# Patient Record
Sex: FEMALE | Race: WHITE | NOT HISPANIC OR LATINO | URBAN - METROPOLITAN AREA
[De-identification: names, ages, dates, MRNs, and addresses within clinical notes are randomized per-mention and may not be internally consistent; named-entity substitution may affect disease eponyms.]

---

## 2017-08-08 ENCOUNTER — OUTPATIENT (OUTPATIENT)
Dept: OUTPATIENT SERVICES | Facility: HOSPITAL | Age: 77
LOS: 1 days | Discharge: HOME | End: 2017-08-08

## 2017-08-08 DIAGNOSIS — E11.9 TYPE 2 DIABETES MELLITUS WITHOUT COMPLICATIONS: ICD-10-CM

## 2017-08-08 DIAGNOSIS — E78.4 OTHER HYPERLIPIDEMIA: ICD-10-CM

## 2017-08-08 DIAGNOSIS — I10 ESSENTIAL (PRIMARY) HYPERTENSION: ICD-10-CM

## 2017-08-08 DIAGNOSIS — R07.9 CHEST PAIN, UNSPECIFIED: ICD-10-CM

## 2017-08-08 DIAGNOSIS — E03.9 HYPOTHYROIDISM, UNSPECIFIED: ICD-10-CM

## 2017-08-08 DIAGNOSIS — I48.2 CHRONIC ATRIAL FIBRILLATION: ICD-10-CM

## 2017-08-08 DIAGNOSIS — E78.5 HYPERLIPIDEMIA, UNSPECIFIED: ICD-10-CM

## 2017-08-08 DIAGNOSIS — I48.91 UNSPECIFIED ATRIAL FIBRILLATION: ICD-10-CM

## 2019-07-11 ENCOUNTER — OUTPATIENT (OUTPATIENT)
Dept: OUTPATIENT SERVICES | Facility: HOSPITAL | Age: 79
LOS: 1 days | Discharge: HOME | End: 2019-07-11

## 2019-07-11 DIAGNOSIS — E03.9 HYPOTHYROIDISM, UNSPECIFIED: ICD-10-CM

## 2019-07-11 DIAGNOSIS — I10 ESSENTIAL (PRIMARY) HYPERTENSION: ICD-10-CM

## 2019-07-11 DIAGNOSIS — E78.5 HYPERLIPIDEMIA, UNSPECIFIED: ICD-10-CM

## 2019-07-11 DIAGNOSIS — E11.9 TYPE 2 DIABETES MELLITUS WITHOUT COMPLICATIONS: ICD-10-CM

## 2023-04-24 PROBLEM — Z00.00 ENCOUNTER FOR PREVENTIVE HEALTH EXAMINATION: Status: ACTIVE | Noted: 2023-04-24

## 2023-05-23 ENCOUNTER — APPOINTMENT (OUTPATIENT)
Dept: CARDIOLOGY | Facility: CLINIC | Age: 83
End: 2023-05-23
Payer: MEDICARE

## 2023-05-23 ENCOUNTER — NON-APPOINTMENT (OUTPATIENT)
Age: 83
End: 2023-05-23

## 2023-05-23 VITALS
DIASTOLIC BLOOD PRESSURE: 90 MMHG | BODY MASS INDEX: 27.97 KG/M2 | TEMPERATURE: 97 F | HEIGHT: 62 IN | SYSTOLIC BLOOD PRESSURE: 162 MMHG | RESPIRATION RATE: 18 BRPM | OXYGEN SATURATION: 96 % | WEIGHT: 152 LBS | HEART RATE: 56 BPM

## 2023-05-23 VITALS — DIASTOLIC BLOOD PRESSURE: 86 MMHG | SYSTOLIC BLOOD PRESSURE: 140 MMHG

## 2023-05-23 DIAGNOSIS — Z86.79 PERSONAL HISTORY OF OTHER DISEASES OF THE CIRCULATORY SYSTEM: ICD-10-CM

## 2023-05-23 DIAGNOSIS — Z86.39 PERSONAL HISTORY OF OTHER ENDOCRINE, NUTRITIONAL AND METABOLIC DISEASE: ICD-10-CM

## 2023-05-23 DIAGNOSIS — Z78.9 OTHER SPECIFIED HEALTH STATUS: ICD-10-CM

## 2023-05-23 PROCEDURE — 93000 ELECTROCARDIOGRAM COMPLETE: CPT

## 2023-05-23 PROCEDURE — 99204 OFFICE O/P NEW MOD 45 MIN: CPT | Mod: 25

## 2023-05-23 RX ORDER — PRAVASTATIN SODIUM 20 MG/1
20 TABLET ORAL DAILY
Refills: 0 | Status: ACTIVE | COMMUNITY

## 2023-05-23 RX ORDER — LEVOTHYROXINE SODIUM 88 UG/1
88 TABLET ORAL DAILY
Refills: 0 | Status: ACTIVE | COMMUNITY

## 2023-05-23 RX ORDER — METOPROLOL SUCCINATE 100 MG/1
100 TABLET, EXTENDED RELEASE ORAL DAILY
Refills: 0 | Status: ACTIVE | COMMUNITY

## 2023-05-23 RX ORDER — FERROUS SULFATE 325(65) MG
325 TABLET ORAL
Refills: 0 | Status: ACTIVE | COMMUNITY

## 2023-05-23 RX ORDER — APIXABAN 5 MG/1
5 TABLET, FILM COATED ORAL
Refills: 0 | Status: ACTIVE | COMMUNITY

## 2023-05-29 NOTE — REVIEW OF SYSTEMS
[Feeling Fatigued] : feeling fatigued [Joint Pain] : joint pain [Negative] : Heme/Lymph [FreeTextEntry5] : see hpi

## 2023-05-29 NOTE — PHYSICAL EXAM
[No Acute Distress] : no acute distress [Normal Venous Pressure] : normal venous pressure [No Carotid Bruit] : no carotid bruit [Normal S1, S2] : normal S1, S2 [No Rub] : no rub [No Gallop] : no gallop [Murmur] : murmur [Clear Lung Fields] : clear lung fields [Good Air Entry] : good air entry [No Respiratory Distress] : no respiratory distress  [Soft] : abdomen soft [Non Tender] : non-tender [No Masses/organomegaly] : no masses/organomegaly [Normal Bowel Sounds] : normal bowel sounds [Abnormal Gait] : abnormal gait [No Edema] : no edema [No Cyanosis] : no cyanosis [No Clubbing] : no clubbing [No Varicosities] : no varicosities [No Rash] : no rash [No Skin Lesions] : no skin lesions [Moves all extremities] : moves all extremities [No Focal Deficits] : no focal deficits [Normal Speech] : normal speech [Alert and Oriented] : alert and oriented [Normal memory] : normal memory [de-identified] : 2/6 syst m

## 2023-05-29 NOTE — ASSESSMENT
[FreeTextEntry1] : 81 yo female with pmhx and presentation as above\par monitor bp at home, might need escalation of rx as bp still not at goal\par cont a/c and amio\par yearly tfts and pfts\par all labs and last echo reviewed\par aggressive risk modif\par diet and act as tolerated\par rtc 6-8 weeks

## 2023-05-29 NOTE — HISTORY OF PRESENT ILLNESS
[FreeTextEntry1] : 83 yo female with pmhx as below is here for her initial eval\par long standing htn/diastolic chf\par paroxysmal a.fib, on long term ac\par no major cv complains\par bp is labile\par et is stable \par ros is otherwise as below

## 2023-06-30 ENCOUNTER — APPOINTMENT (OUTPATIENT)
Dept: CARDIOLOGY | Facility: CLINIC | Age: 83
End: 2023-06-30

## 2023-10-31 ENCOUNTER — APPOINTMENT (OUTPATIENT)
Dept: CARDIOLOGY | Facility: CLINIC | Age: 83
End: 2023-10-31
Payer: MEDICARE

## 2023-10-31 VITALS — DIASTOLIC BLOOD PRESSURE: 92 MMHG | SYSTOLIC BLOOD PRESSURE: 160 MMHG

## 2023-10-31 VITALS
HEIGHT: 62 IN | HEART RATE: 56 BPM | WEIGHT: 160 LBS | DIASTOLIC BLOOD PRESSURE: 102 MMHG | SYSTOLIC BLOOD PRESSURE: 200 MMHG | BODY MASS INDEX: 29.44 KG/M2

## 2023-10-31 PROCEDURE — 93000 ELECTROCARDIOGRAM COMPLETE: CPT

## 2023-10-31 PROCEDURE — 99214 OFFICE O/P EST MOD 30 MIN: CPT | Mod: 25

## 2023-12-18 ENCOUNTER — NON-APPOINTMENT (OUTPATIENT)
Age: 83
End: 2023-12-18

## 2024-01-08 ENCOUNTER — NON-APPOINTMENT (OUTPATIENT)
Age: 84
End: 2024-01-08

## 2024-01-09 ENCOUNTER — APPOINTMENT (OUTPATIENT)
Dept: CARDIOLOGY | Facility: CLINIC | Age: 84
End: 2024-01-09
Payer: MEDICARE

## 2024-01-09 VITALS — HEART RATE: 89 BPM | DIASTOLIC BLOOD PRESSURE: 70 MMHG | SYSTOLIC BLOOD PRESSURE: 112 MMHG | HEIGHT: 62 IN

## 2024-01-09 PROCEDURE — 99214 OFFICE O/P EST MOD 30 MIN: CPT | Mod: 25

## 2024-01-09 PROCEDURE — 93000 ELECTROCARDIOGRAM COMPLETE: CPT

## 2024-01-09 RX ORDER — HYDROCHLOROTHIAZIDE 12.5 MG/1
12.5 TABLET ORAL DAILY
Refills: 0 | Status: DISCONTINUED | COMMUNITY
End: 2024-01-09

## 2024-01-09 RX ORDER — LOSARTAN POTASSIUM AND HYDROCHLOROTHIAZIDE 12.5; 5 MG/1; MG/1
50-12.5 TABLET ORAL DAILY
Refills: 0 | Status: DISCONTINUED | COMMUNITY
End: 2024-01-09

## 2024-01-11 LAB
ALBUMIN SERPL ELPH-MCNC: 3.7 G/DL
ALP BLD-CCNC: 103 U/L
ALT SERPL-CCNC: 20 U/L
ANION GAP SERPL CALC-SCNC: 16 MMOL/L
AST SERPL-CCNC: 34 U/L
BILIRUB SERPL-MCNC: 0.5 MG/DL
BUN SERPL-MCNC: 33 MG/DL
CALCIUM SERPL-MCNC: 9 MG/DL
CHLORIDE SERPL-SCNC: 92 MMOL/L
CHOLEST SERPL-MCNC: 119 MG/DL
CK SERPL-CCNC: 48 U/L
CO2 SERPL-SCNC: 29 MMOL/L
CREAT SERPL-MCNC: 1.43 MG/DL
EGFR: 37 ML/MIN/1.73M2
GLUCOSE SERPL-MCNC: 147 MG/DL
HCT VFR BLD CALC: 41.5 %
HDLC SERPL-MCNC: 54 MG/DL
HGB BLD-MCNC: 13.2 G/DL
LDLC SERPL CALC-MCNC: 47 MG/DL
MCHC RBC-ENTMCNC: 24.9 PG
MCHC RBC-ENTMCNC: 31.8 GM/DL
MCV RBC AUTO: 78.3 FL
NONHDLC SERPL-MCNC: 65 MG/DL
PLATELET # BLD AUTO: 151 K/UL
POTASSIUM SERPL-SCNC: 3.5 MMOL/L
PROT SERPL-MCNC: 6.3 G/DL
RBC # BLD: 5.3 M/UL
RBC # FLD: 19.5 %
SODIUM SERPL-SCNC: 137 MMOL/L
TRIGL SERPL-MCNC: 98 MG/DL
TSH SERPL-ACNC: 4.19 UIU/ML
WBC # FLD AUTO: 9.38 K/UL

## 2024-05-17 ENCOUNTER — APPOINTMENT (OUTPATIENT)
Dept: CARDIOLOGY | Facility: CLINIC | Age: 84
End: 2024-05-17
Payer: MEDICARE

## 2024-05-17 VITALS
HEIGHT: 62 IN | HEART RATE: 62 BPM | SYSTOLIC BLOOD PRESSURE: 180 MMHG | DIASTOLIC BLOOD PRESSURE: 100 MMHG | BODY MASS INDEX: 29.44 KG/M2 | WEIGHT: 160 LBS

## 2024-05-17 VITALS — DIASTOLIC BLOOD PRESSURE: 98 MMHG | SYSTOLIC BLOOD PRESSURE: 178 MMHG

## 2024-05-17 PROCEDURE — 99214 OFFICE O/P EST MOD 30 MIN: CPT | Mod: 25

## 2024-05-17 PROCEDURE — 93000 ELECTROCARDIOGRAM COMPLETE: CPT

## 2024-05-17 RX ORDER — LOSARTAN POTASSIUM AND HYDROCHLOROTHIAZIDE 12.5; 1 MG/1; MG/1
100-12.5 TABLET ORAL DAILY
Qty: 90 | Refills: 3 | Status: ACTIVE | COMMUNITY
Start: 2024-05-17 | End: 1900-01-01

## 2024-05-17 RX ORDER — LOSARTAN POTASSIUM 100 MG/1
100 TABLET, FILM COATED ORAL DAILY
Qty: 3 | Refills: 3 | Status: DISCONTINUED | COMMUNITY
Start: 2024-01-09 | End: 2024-05-17

## 2024-05-19 NOTE — HISTORY OF PRESENT ILLNESS
[FreeTextEntry1] : 82 yo female with pmhx as below is here for her f/up visit long standing htn/diastolic chf paroxysmal a.fib, on long term ac no major cv complains denies chest pain, has SOB with exertion, has occasional palpitations.  recent admission to [facility] with PNA B/p at home has been 160//90 hydralazine 50mg TID was added B/p elevated today.   et is stable  ros is otherwise as below 01/09/24 Chol 119 Trig 98 LDL 47 TSH 4.19 05/04/24 HbA1C 6.1

## 2024-05-19 NOTE — PHYSICAL EXAM
[No Acute Distress] : no acute distress [Normal Venous Pressure] : normal venous pressure [No Carotid Bruit] : no carotid bruit [Normal S1, S2] : normal S1, S2 [No Rub] : no rub [No Gallop] : no gallop [Murmur] : murmur [Clear Lung Fields] : clear lung fields [Good Air Entry] : good air entry [No Respiratory Distress] : no respiratory distress  [Soft] : abdomen soft [Non Tender] : non-tender [No Masses/organomegaly] : no masses/organomegaly [Normal Bowel Sounds] : normal bowel sounds [Abnormal Gait] : abnormal gait [No Edema] : no edema [No Cyanosis] : no cyanosis [No Clubbing] : no clubbing [No Varicosities] : no varicosities [No Rash] : no rash [No Skin Lesions] : no skin lesions [Moves all extremities] : moves all extremities [No Focal Deficits] : no focal deficits [Normal Speech] : normal speech [Alert and Oriented] : alert and oriented [Normal memory] : normal memory [de-identified] : 2/6 syst m

## 2024-05-19 NOTE — ASSESSMENT
[FreeTextEntry1] : 84 yo female with pmhx and presentation as above monitor bp at home cont all meds add Nifedipine ER 60mg  cont a/c and amio yearly tfts and pfts aggressive risk modif diet and act as tolerated rtc in 1 week for B/p check

## 2024-05-21 RX ORDER — NIFEDIPINE 60 MG/1
60 TABLET, EXTENDED RELEASE ORAL DAILY
Qty: 90 | Refills: 3 | Status: DISCONTINUED | COMMUNITY
Start: 2024-01-09 | End: 2024-05-21

## 2024-05-21 RX ORDER — HYDRALAZINE HYDROCHLORIDE 100 MG/1
100 TABLET ORAL 3 TIMES DAILY
Qty: 270 | Refills: 3 | Status: ACTIVE | COMMUNITY
Start: 2024-05-17

## 2024-05-28 ENCOUNTER — APPOINTMENT (OUTPATIENT)
Dept: CARDIOLOGY | Facility: CLINIC | Age: 84
End: 2024-05-28
Payer: MEDICARE

## 2024-05-28 VITALS
HEART RATE: 54 BPM | HEIGHT: 62 IN | SYSTOLIC BLOOD PRESSURE: 110 MMHG | DIASTOLIC BLOOD PRESSURE: 68 MMHG | BODY MASS INDEX: 30 KG/M2 | WEIGHT: 163 LBS

## 2024-05-28 DIAGNOSIS — E78.5 HYPERLIPIDEMIA, UNSPECIFIED: ICD-10-CM

## 2024-05-28 DIAGNOSIS — I48.0 PAROXYSMAL ATRIAL FIBRILLATION: ICD-10-CM

## 2024-05-28 DIAGNOSIS — I10 ESSENTIAL (PRIMARY) HYPERTENSION: ICD-10-CM

## 2024-05-28 DIAGNOSIS — R73.03 PREDIABETES.: ICD-10-CM

## 2024-05-28 PROCEDURE — 93000 ELECTROCARDIOGRAM COMPLETE: CPT

## 2024-05-28 PROCEDURE — 99214 OFFICE O/P EST MOD 30 MIN: CPT | Mod: 25

## 2024-05-28 RX ORDER — AMIODARONE HYDROCHLORIDE 100 MG/1
100 TABLET ORAL DAILY
Qty: 90 | Refills: 3 | Status: ACTIVE | COMMUNITY

## 2024-05-28 RX ORDER — HYDRALAZINE HYDROCHLORIDE 50 MG/1
50 TABLET ORAL 3 TIMES DAILY
Refills: 0 | Status: DISCONTINUED | COMMUNITY
End: 2024-05-28

## 2024-05-28 NOTE — ASSESSMENT
[FreeTextEntry1] : 84 yo female with pmhx and presentation as above progressive roseline bp better 2d echo and stress mpi monitor bp at home cont all meds cont a/c and amio yearly tfts and pfts aggressive risk modif diet and act as tolerated rtc in 4 to 5 weeks

## 2024-05-28 NOTE — HISTORY OF PRESENT ILLNESS
[FreeTextEntry1] : 82 yo female with pmhx as below is here for her f/up visit long standing htn/diastolic chf paroxysmal a.fib, on long term ac no major cv complains denies chest pain, has SOB with exertion worsened over the last few months  has occasional palpitations.  recent admission to [facility] with PNA B/p at home has been 160//90 2 weeks ago  hydralazine was increased  B/p normal today.   Couldn't tolerate Nifedipine due to edema  et is stable  ros is otherwise as below

## 2024-05-28 NOTE — PHYSICAL EXAM
[No Acute Distress] : no acute distress [Normal Venous Pressure] : normal venous pressure [No Carotid Bruit] : no carotid bruit [Normal S1, S2] : normal S1, S2 [No Rub] : no rub [No Gallop] : no gallop [Murmur] : murmur [Clear Lung Fields] : clear lung fields [Good Air Entry] : good air entry [No Respiratory Distress] : no respiratory distress  [Soft] : abdomen soft [Non Tender] : non-tender [No Masses/organomegaly] : no masses/organomegaly [Normal Bowel Sounds] : normal bowel sounds [Abnormal Gait] : abnormal gait [No Edema] : no edema [No Cyanosis] : no cyanosis [No Clubbing] : no clubbing [No Varicosities] : no varicosities [No Rash] : no rash [No Skin Lesions] : no skin lesions [Moves all extremities] : moves all extremities [No Focal Deficits] : no focal deficits [Normal Speech] : normal speech [Alert and Oriented] : alert and oriented [Normal memory] : normal memory [de-identified] : 2/6 syst m

## 2024-07-09 ENCOUNTER — APPOINTMENT (OUTPATIENT)
Dept: CARDIOLOGY | Facility: CLINIC | Age: 84
End: 2024-07-09
Payer: MEDICARE

## 2024-07-09 VITALS
WEIGHT: 155 LBS | OXYGEN SATURATION: 99 % | HEART RATE: 50 BPM | BODY MASS INDEX: 28.52 KG/M2 | DIASTOLIC BLOOD PRESSURE: 70 MMHG | SYSTOLIC BLOOD PRESSURE: 132 MMHG | HEIGHT: 62 IN

## 2024-07-09 DIAGNOSIS — I48.0 PAROXYSMAL ATRIAL FIBRILLATION: ICD-10-CM

## 2024-07-09 DIAGNOSIS — E78.5 HYPERLIPIDEMIA, UNSPECIFIED: ICD-10-CM

## 2024-07-09 DIAGNOSIS — R73.03 PREDIABETES.: ICD-10-CM

## 2024-07-09 DIAGNOSIS — I10 ESSENTIAL (PRIMARY) HYPERTENSION: ICD-10-CM

## 2024-07-09 PROCEDURE — 93000 ELECTROCARDIOGRAM COMPLETE: CPT

## 2024-07-09 PROCEDURE — 99214 OFFICE O/P EST MOD 30 MIN: CPT | Mod: 25

## 2024-10-29 ENCOUNTER — LABORATORY RESULT (OUTPATIENT)
Age: 84
End: 2024-10-29

## 2024-10-29 ENCOUNTER — APPOINTMENT (OUTPATIENT)
Dept: CARDIOLOGY | Facility: CLINIC | Age: 84
End: 2024-10-29
Payer: MEDICARE

## 2024-10-29 VITALS
SYSTOLIC BLOOD PRESSURE: 124 MMHG | DIASTOLIC BLOOD PRESSURE: 64 MMHG | BODY MASS INDEX: 27.79 KG/M2 | HEART RATE: 57 BPM | HEIGHT: 62 IN | WEIGHT: 151 LBS

## 2024-10-29 PROCEDURE — 99214 OFFICE O/P EST MOD 30 MIN: CPT | Mod: 25

## 2024-10-29 PROCEDURE — 93000 ELECTROCARDIOGRAM COMPLETE: CPT

## 2024-11-04 LAB
HCT VFR BLD CALC: 44.5 %
HGB BLD-MCNC: 13.8 G/DL
MCHC RBC-ENTMCNC: 24.6 PG
MCHC RBC-ENTMCNC: 31 G/DL
MCV RBC AUTO: 79.5 FL
PLATELET # BLD AUTO: 254 K/UL
RBC # BLD: 5.6 M/UL
RBC # FLD: 19.9 %
WBC # FLD AUTO: 7.86 K/UL

## 2024-11-05 ENCOUNTER — EMERGENCY (EMERGENCY)
Facility: HOSPITAL | Age: 84
LOS: 0 days | Discharge: HOME | End: 2024-11-06
Attending: EMERGENCY MEDICINE
Payer: MEDICARE

## 2024-11-05 DIAGNOSIS — R20.2 PARESTHESIA OF SKIN: ICD-10-CM

## 2024-11-05 DIAGNOSIS — I10 ESSENTIAL (PRIMARY) HYPERTENSION: ICD-10-CM

## 2024-11-05 DIAGNOSIS — R53.81 OTHER MALAISE: ICD-10-CM

## 2024-11-05 DIAGNOSIS — R51.9 HEADACHE, UNSPECIFIED: ICD-10-CM

## 2024-11-05 DIAGNOSIS — Z88.0 ALLERGY STATUS TO PENICILLIN: ICD-10-CM

## 2024-11-05 DIAGNOSIS — I48.91 UNSPECIFIED ATRIAL FIBRILLATION: ICD-10-CM

## 2024-11-05 DIAGNOSIS — E11.9 TYPE 2 DIABETES MELLITUS WITHOUT COMPLICATIONS: ICD-10-CM

## 2024-11-05 PROCEDURE — 81001 URINALYSIS AUTO W/SCOPE: CPT

## 2024-11-05 PROCEDURE — 82962 GLUCOSE BLOOD TEST: CPT

## 2024-11-05 PROCEDURE — 99285 EMERGENCY DEPT VISIT HI MDM: CPT | Mod: 25

## 2024-11-05 PROCEDURE — 80053 COMPREHEN METABOLIC PANEL: CPT

## 2024-11-05 PROCEDURE — 96374 THER/PROPH/DIAG INJ IV PUSH: CPT

## 2024-11-05 PROCEDURE — 0241U: CPT

## 2024-11-05 PROCEDURE — 84132 ASSAY OF SERUM POTASSIUM: CPT

## 2024-11-05 PROCEDURE — 85025 COMPLETE CBC W/AUTO DIFF WBC: CPT

## 2024-11-05 PROCEDURE — 36415 COLL VENOUS BLD VENIPUNCTURE: CPT

## 2024-11-05 PROCEDURE — 87086 URINE CULTURE/COLONY COUNT: CPT

## 2024-11-05 PROCEDURE — 70450 CT HEAD/BRAIN W/O DYE: CPT | Mod: MC

## 2024-11-05 PROCEDURE — 93005 ELECTROCARDIOGRAM TRACING: CPT

## 2024-11-06 VITALS
RESPIRATION RATE: 18 BRPM | HEART RATE: 85 BPM | OXYGEN SATURATION: 98 % | HEIGHT: 62 IN | TEMPERATURE: 96 F | SYSTOLIC BLOOD PRESSURE: 210 MMHG | DIASTOLIC BLOOD PRESSURE: 92 MMHG | WEIGHT: 154.98 LBS

## 2024-11-06 VITALS
SYSTOLIC BLOOD PRESSURE: 194 MMHG | HEART RATE: 90 BPM | DIASTOLIC BLOOD PRESSURE: 86 MMHG | OXYGEN SATURATION: 98 % | RESPIRATION RATE: 18 BRPM

## 2024-11-06 LAB
ALBUMIN SERPL ELPH-MCNC: 4.1 G/DL — SIGNIFICANT CHANGE UP (ref 3.5–5.2)
ALP SERPL-CCNC: 98 U/L — SIGNIFICANT CHANGE UP (ref 30–115)
ALT FLD-CCNC: 30 U/L — SIGNIFICANT CHANGE UP (ref 0–41)
ANION GAP SERPL CALC-SCNC: 14 MMOL/L — SIGNIFICANT CHANGE UP (ref 7–14)
APPEARANCE UR: CLEAR — SIGNIFICANT CHANGE UP
AST SERPL-CCNC: 32 U/L — SIGNIFICANT CHANGE UP (ref 0–41)
BACTERIA # UR AUTO: ABNORMAL /HPF
BASOPHILS # BLD AUTO: 0.03 K/UL — SIGNIFICANT CHANGE UP (ref 0–0.2)
BASOPHILS NFR BLD AUTO: 0.4 % — SIGNIFICANT CHANGE UP (ref 0–1)
BILIRUB SERPL-MCNC: 0.5 MG/DL — SIGNIFICANT CHANGE UP (ref 0.2–1.2)
BILIRUB UR-MCNC: NEGATIVE — SIGNIFICANT CHANGE UP
BUN SERPL-MCNC: 34 MG/DL — HIGH (ref 10–20)
CALCIUM SERPL-MCNC: 9.8 MG/DL — SIGNIFICANT CHANGE UP (ref 8.4–10.5)
CHLORIDE SERPL-SCNC: 94 MMOL/L — LOW (ref 98–110)
CO2 SERPL-SCNC: 22 MMOL/L — SIGNIFICANT CHANGE UP (ref 17–32)
COLOR SPEC: YELLOW — SIGNIFICANT CHANGE UP
CREAT SERPL-MCNC: 1.6 MG/DL — HIGH (ref 0.7–1.5)
DIFF PNL FLD: NEGATIVE — SIGNIFICANT CHANGE UP
EGFR: 32 ML/MIN/1.73M2 — LOW
EOSINOPHIL # BLD AUTO: 0.07 K/UL — SIGNIFICANT CHANGE UP (ref 0–0.7)
EOSINOPHIL NFR BLD AUTO: 0.9 % — SIGNIFICANT CHANGE UP (ref 0–8)
EPI CELLS # UR: PRESENT
FLUAV AG NPH QL: SIGNIFICANT CHANGE UP
FLUBV AG NPH QL: SIGNIFICANT CHANGE UP
GLUCOSE SERPL-MCNC: 115 MG/DL — HIGH (ref 70–99)
GLUCOSE UR QL: NEGATIVE MG/DL — SIGNIFICANT CHANGE UP
HCT VFR BLD CALC: 39.1 % — SIGNIFICANT CHANGE UP (ref 37–47)
HGB BLD-MCNC: 12.9 G/DL — SIGNIFICANT CHANGE UP (ref 12–16)
IMM GRANULOCYTES NFR BLD AUTO: 0.3 % — SIGNIFICANT CHANGE UP (ref 0.1–0.3)
KETONES UR-MCNC: NEGATIVE MG/DL — SIGNIFICANT CHANGE UP
LEUKOCYTE ESTERASE UR-ACNC: ABNORMAL
LYMPHOCYTES # BLD AUTO: 1.24 K/UL — SIGNIFICANT CHANGE UP (ref 1.2–3.4)
LYMPHOCYTES # BLD AUTO: 16.1 % — LOW (ref 20.5–51.1)
MCHC RBC-ENTMCNC: 24.8 PG — LOW (ref 27–31)
MCHC RBC-ENTMCNC: 33 G/DL — SIGNIFICANT CHANGE UP (ref 32–37)
MCV RBC AUTO: 75.2 FL — LOW (ref 81–99)
MONOCYTES # BLD AUTO: 0.84 K/UL — HIGH (ref 0.1–0.6)
MONOCYTES NFR BLD AUTO: 10.9 % — HIGH (ref 1.7–9.3)
NEUTROPHILS # BLD AUTO: 5.5 K/UL — SIGNIFICANT CHANGE UP (ref 1.4–6.5)
NEUTROPHILS NFR BLD AUTO: 71.4 % — SIGNIFICANT CHANGE UP (ref 42.2–75.2)
NITRITE UR-MCNC: NEGATIVE — SIGNIFICANT CHANGE UP
NRBC # BLD: 0 /100 WBCS — SIGNIFICANT CHANGE UP (ref 0–0)
PH UR: 6 — SIGNIFICANT CHANGE UP (ref 5–8)
PLATELET # BLD AUTO: 239 K/UL — SIGNIFICANT CHANGE UP (ref 130–400)
PMV BLD: 8.6 FL — SIGNIFICANT CHANGE UP (ref 7.4–10.4)
POTASSIUM SERPL-MCNC: 5.3 MMOL/L — HIGH (ref 3.5–5)
POTASSIUM SERPL-MCNC: 5.8 MMOL/L — HIGH (ref 3.5–5)
POTASSIUM SERPL-SCNC: 5.3 MMOL/L — HIGH (ref 3.5–5)
POTASSIUM SERPL-SCNC: 5.8 MMOL/L — HIGH (ref 3.5–5)
PROT SERPL-MCNC: 6.6 G/DL — SIGNIFICANT CHANGE UP (ref 6–8)
PROT UR-MCNC: NEGATIVE MG/DL — SIGNIFICANT CHANGE UP
RBC # BLD: 5.2 M/UL — SIGNIFICANT CHANGE UP (ref 4.2–5.4)
RBC # FLD: 17.5 % — HIGH (ref 11.5–14.5)
RBC CASTS # UR COMP ASSIST: 2 /HPF — SIGNIFICANT CHANGE UP (ref 0–4)
RSV RNA NPH QL NAA+NON-PROBE: SIGNIFICANT CHANGE UP
SARS-COV-2 RNA SPEC QL NAA+PROBE: SIGNIFICANT CHANGE UP
SODIUM SERPL-SCNC: 130 MMOL/L — LOW (ref 135–146)
SP GR SPEC: 1.01 — SIGNIFICANT CHANGE UP (ref 1–1.03)
UROBILINOGEN FLD QL: 0.2 MG/DL — SIGNIFICANT CHANGE UP (ref 0.2–1)
WBC # BLD: 7.7 K/UL — SIGNIFICANT CHANGE UP (ref 4.8–10.8)
WBC # FLD AUTO: 7.7 K/UL — SIGNIFICANT CHANGE UP (ref 4.8–10.8)
WBC UR QL: 8 /HPF — HIGH (ref 0–5)

## 2024-11-06 PROCEDURE — 70450 CT HEAD/BRAIN W/O DYE: CPT | Mod: 26,MC

## 2024-11-06 PROCEDURE — 93010 ELECTROCARDIOGRAM REPORT: CPT

## 2024-11-06 RX ORDER — SODIUM CHLORIDE 9 MG/ML
250 INJECTION, SOLUTION INTRAMUSCULAR; INTRAVENOUS; SUBCUTANEOUS ONCE
Refills: 0 | Status: COMPLETED | OUTPATIENT
Start: 2024-11-06 | End: 2024-11-06

## 2024-11-06 RX ORDER — LABETALOL HCL 200 MG
5 TABLET ORAL ONCE
Refills: 0 | Status: COMPLETED | OUTPATIENT
Start: 2024-11-06 | End: 2024-11-06

## 2024-11-06 RX ORDER — METOCLOPRAMIDE HCL 10 MG
10 TABLET ORAL ONCE
Refills: 0 | Status: COMPLETED | OUTPATIENT
Start: 2024-11-06 | End: 2024-11-06

## 2024-11-06 RX ORDER — MECLIZINE HCL 25 MG
50 TABLET ORAL ONCE
Refills: 0 | Status: COMPLETED | OUTPATIENT
Start: 2024-11-06 | End: 2024-11-06

## 2024-11-06 RX ORDER — SODIUM ZIRCONIUM CYCLOSILICATE 10 G/10G
10 POWDER, FOR SUSPENSION ORAL ONCE
Refills: 0 | Status: COMPLETED | OUTPATIENT
Start: 2024-11-06 | End: 2024-11-06

## 2024-11-06 RX ADMIN — Medication 104 MILLIGRAM(S): at 01:25

## 2024-11-06 RX ADMIN — Medication 50 MILLIGRAM(S): at 01:03

## 2024-11-06 RX ADMIN — SODIUM ZIRCONIUM CYCLOSILICATE 10 GRAM(S): 10 POWDER, FOR SUSPENSION ORAL at 02:18

## 2024-11-06 RX ADMIN — SODIUM CHLORIDE 250 MILLILITER(S): 9 INJECTION, SOLUTION INTRAMUSCULAR; INTRAVENOUS; SUBCUTANEOUS at 01:04

## 2024-11-06 NOTE — ED ADULT NURSE NOTE - NSFALLUNIVINTERV_ED_ALL_ED
Bed/Stretcher in lowest position, wheels locked, appropriate side rails in place/Call bell, personal items and telephone in reach/Instruct patient to call for assistance before getting out of bed/chair/stretcher/Non-slip footwear applied when patient is off stretcher/[place] to call system/Physically safe environment - no spills, clutter or unnecessary equipment/Purposeful proactive rounding/Room/bathroom lighting operational, light cord in reach

## 2024-11-06 NOTE — ED PROVIDER NOTE - PATIENT PORTAL LINK FT
You can access the FollowMyHealth Patient Portal offered by [facility] by registering at the following website: [email or web address]. By joining [facility] FollowMyHealth portal, you will also be able to view your health information using other applications (apps) compatible with our system.

## 2024-11-06 NOTE — ED PROVIDER NOTE - PHYSICAL EXAMINATION
Vital Signs: I have reviewed the initial vital signs.  Constitutional: appears stated age, no acute distress  Eyes: Sclera clear, EOMI.  Cardiovascular: S1 and S2, regular rate, regular rhythm, well-perfused extremities, radial pulses equal and 2+, pedal pulses 2+ and equal  Respiratory: unlabored respiratory effort, clear to auscultation bilaterally no wheezing, rales, or rhonchi  Gastrointestinal:  abdomen soft, non-tender  Musculoskeletal: supple neck, no lower extremity edema  Integumentary: warm, dry, no rash  Neurologic: awake, alert, oriented x3, extremities’ motor and sensory functions grossly intact, no clonus to bilateral lower extremities

## 2024-11-06 NOTE — ED PROVIDER NOTE - PROGRESS NOTE DETAILS
AY: Patient reports that her dizziness and head discomfort have resolved.  He is amenable to follow-up outpatient for spinal MRI. Able to ambulate at baseline (with cane) without assistance.

## 2024-11-06 NOTE — ED PROVIDER NOTE - NSFOLLOWUPINSTRUCTIONS_ED_ALL_ED_FT
Follow-up with neurosurgery - Our Emergency Department Referral Coordinators will be reaching out to you in the next 24-48 hours from 9:00am to 5:00pm to schedule a follow up appointment. Please expect a phone call from the hospital in that time frame. If you do not receive a call or if you have any questions or concerns, you can reach them at   [phone].    Headache    A headache is pain or discomfort felt around the head or neck area. The specific cause of a headache may not be found as there are many types including tension headaches, migraine headaches, and cluster headaches. Watch your condition for any changes. Things you can do to manage your pain include taking over the counter and prescription medications as instructed by your health care provider, lying down in a dark quiet room, limiting stress, getting regular sleep, and refraining from alcohol and tobacco products.    SEEK IMMEDIATE MEDICAL CARE IF YOU HAVE ANY OF THE FOLLOWING SYMPTOMS: fever, vomiting, stiff neck, loss of vision, problems with speech, muscle weakness, loss of balance, trouble walking, passing out, or confusion.    Paresthesia  Paresthesia is an abnormal burning or prickling sensation. It is usually felt in the hands, arms, legs, or feet. However, it may occur in any part of the body. Usually, paresthesia is not painful. It may feel like:  Tingling or numbness.  Buzzing.  Itching.  Paresthesia may occur without any clear cause, or it may be caused by:  Breathing too quickly (hyperventilation).  Pressure on a nerve.  An underlying medical condition.  Side effects of a medicine.  Nutritional deficiencies.  Exposure to toxic chemicals.  Most people experience temporary (transient) paresthesia at some time in their lives. For some people, it may be long-lasting (chronic) because of an underlying medical condition. If you have paresthesia that lasts a long time, you need to be evaluated by your health care provider.    Follow these instructions at home:  Nutrition    A plate with examples of foods in a healthy diet.  Eat a healthy diet. This includes:  Eating foods that are high in fiber, such as beans, whole grains, and fresh fruits and vegetables.  Limiting foods that are high in fat and processed sugars, such as fried or sweet foods.  Alcohol use    A sign showing that a person should not drink alcohol.  Avoid or limit alcohol. Too much alcohol can cause a vitamin B deficiency, and vitamin B is needed for healthy nerves.  Do not drink alcohol if:  Your health care provider tells you not to drink.  You are pregnant, may be pregnant, or are planning to become pregnant.  If you drink alcohol:  Limit how much you have to:  0–1 drink a day for women.  0–2 drinks a day for men.  Know how much alcohol is in your drink. In the [place] one drink equals one 12 oz bottle of beer (355 mL), one 5 oz glass of wine (148 mL), or one 1½ oz glass of hard liquor (44 mL).  General instructions    Take over-the-counter and prescription medicines only as told by your health care provider.  Do not use any products that contain nicotine or tobacco. These products include cigarettes, chewing tobacco, and vaping devices, such as e-cigarettes. If you need help quitting, ask your health care provider.  If you have diabetes, work closely with your health care provider to keep your blood sugar under control.  If you have numbness in your feet:  Check every day for signs of injury or infection. Watch for redness, warmth, and swelling.  Wear padded socks and comfortable shoes. These help protect your feet.  Keep all follow-up visits. This is important.  Contact a health care provider if you:  Have paresthesia that gets worse or does not go away.  Have numbness after an injury.  Have a burning or prickling feeling that gets worse when you walk.  Have pain, cramps, or dizziness, or you faint.  Develop a rash.  Get help right away if you:  Feel muscle weakness.  Develop new weakness in an arm or leg.  Have trouble walking or moving.  Have problems with speech, understanding, or vision.  Feel confused.  Cannot control your bladder or bowel movements.  These symptoms may be an emergency. Get help right away. Call 911.  Do not wait to see if the symptoms will go away.  Do not drive yourself to the hospital.  Summary  Paresthesia is an abnormal burning or prickling sensation that is usually felt in the hands, arms, legs, or feet. It may also occur in other parts of the body.  Paresthesia may occur without any clear cause, or it may be caused by breathing too quickly (hyperventilation), pressure on a nerve, an underlying medical condition, side effects of a medicine, nutritional deficiencies, or exposure to toxic chemicals.  If you have paresthesia that lasts a long time, you need to be evaluated by your health care provider.

## 2024-11-06 NOTE — ED PROVIDER NOTE - DISCHARGE DATE
History  Chief Complaint   Patient presents with    Shortness of Breath     pt presents to the ed with  pt was sent from pcp for low sats  pt states that she was recently discharged by the hospital for pneumonia      Patient sent in by PMD for dyspnea and hypoxia  Patient was recently admitted for influenza and pneumonia  History of breathing problems  Worse for a few days  No chest pain  Patient was also noted by PMD and  as being lethargic  History provided by:  Patient   used: No    Shortness of Breath   Associated symptoms: no chest pain and no fever        Prior to Admission Medications   Prescriptions Last Dose Informant Patient Reported? Taking?    COD LIVER OIL PO 2018 at Unknown time Self Yes Yes   Sig: Take by mouth daily   LORazepam (ATIVAN) 1 mg tablet Unknown at Unknown time Self Yes No   Sig: Take by mouth   Linaclotide (LINZESS) 72 MCG CAPS 2018 at 1000  No Yes   Sig: Take 72 mcg by mouth daily before breakfast   Multiple Vitamins-Minerals (MULTIVITAMIN ADULT PO) 2018 at 1000 Self Yes Yes   Sig: Take 1 tablet by mouth daily   NOVOLOG MIX 70/30 FLEXPEN (70-30) 100 UNIT/ML SUPN 2018 at 1000 Self Yes Yes   Si Units 2 (two) times a day before meals     ascorbic acid (VITAMIN C) 500 mg tablet 2018 at 1000 Self Yes Yes   Sig: Take 500 mg by mouth daily   aspirin 325 mg tablet 2018 at 1000 Self Yes Yes   Sig: Take 1 tablet by mouth daily   bisacodyl (DULCOLAX) 5 mg EC tablet 2018 at Unknown time Self Yes Yes   Sig: Take 10 mg by mouth daily at bedtime   clotrimazole (MYCELEX) 10 mg jaswant 2018 at 1000  No Yes   Sig: Take 1 tablet (10 mg total) by mouth 4 (four) times a day for 14 days   dextromethorphan-guaiFENesin (ROBITUSSIN DM)  mg/5 mL syrup 2018 at Unknown time Self No Yes   Sig: Take 10 mL by mouth every 4 (four) hours as needed for cough   diclofenac sodium (VOLTAREN) 1 % Unknown at Unknown time Self Yes No Sig: Place on the skin   dicyclomine (BENTYL) 10 mg capsule 2/21/2018 at Unknown time Self Yes Yes   Sig: Take 1 capsule by mouth 3 (three) times a day   escitalopram (LEXAPRO) 10 mg tablet 2/21/2018 at Unknown time  No Yes   Sig: Take 1 tablet (10 mg total) by mouth daily   fentaNYL (DURAGESIC) 25 mcg/hr 2/21/2018 at Unknown time Self Yes Yes   folic acid (FOLVITE) 1 mg tablet 2/22/2018 at 1000 Self Yes Yes   Sig: Take by mouth daily   furosemide (LASIX) 40 mg tablet 2/22/2018 at 1000 Self Yes Yes   Sig: Take 40 mg by mouth daily after lunch   gabapentin (NEURONTIN) 100 mg capsule 2/22/2018 at 1000 Self Yes Yes   ipratropium-albuterol (DUONEB) 0 5-2 5 mg/3 mL Unknown at Unknown time Self Yes No   Sig: Inhale 1 each every 4 (four) hours as needed   lactulose 10 g/15 mL Unknown at Unknown time Self Yes No   Sig: Take 15 mL by mouth daily as needed     levalbuterol (XOPENEX) 1 25 mg/0 5 mL nebulizer solution Unknown at Unknown time Self No No   Sig: Take 0 5 mL (1 25 mg total) by nebulization 3 (three) times a day for 30 days   levothyroxine 125 mcg tablet 2/22/2018 at 0600 Self Yes Yes   Sig: Take 1 tablet by mouth daily   lisinopril (ZESTRIL) 10 mg tablet 2/22/2018 at 1000 Self Yes Yes   Sig: Take 1 tablet by mouth daily   metolazone (ZAROXOLYN) 2 5 mg tablet 2/21/2018 at Unknown time Self Yes Yes   Sig: Take 1 tablet by mouth   metoprolol tartrate (LOPRESSOR) 25 mg tablet 2/22/2018 at 1000 Self Yes Yes   Sig: Take 1 tablet by mouth 2 (two) times a day   oxyCODONE-acetaminophen (PERCOCET) 5-325 mg per tablet Unknown at Unknown time Self Yes No   Sig: Take 1 tablet by mouth every 4 (four) hours as needed for moderate pain   oxyCODONE-acetaminophen (PERCOCET) 5-325 mg per tablet Unknown at Unknown time Self Yes No   Sig: Take by mouth every 6 (six) hours   polyethylene glycol (MIRALAX) 17 g packet 2/22/2018 at 1000 Self No Yes   Sig: Take 17 g by mouth daily   Patient taking differently: Take 17 g by mouth 2 (two) times a day     potassium chloride (K-DUR,KLOR-CON) 10 mEq tablet Unknown at Unknown time Self No No   Sig: Take 2 tablets by mouth daily   pramipexole (MIRAPEX) 1 mg tablet 2/22/2018 at 1000 Self Yes Yes   Sig: Take 1 tablet by mouth 3 (three) times a day   torsemide (DEMADEX) 20 mg tablet 2/22/2018 at Unknown time  No Yes   Sig: Take 1 tablet (20 mg total) by mouth daily      Facility-Administered Medications Last Administration Doses Remaining   levalbuterol (XOPENEX) inhalation solution 1 25 mg None recorded 120          Past Medical History:   Diagnosis Date    Anxiety     Arthritis     Asthma     [facility]     [facility] mellitus [facility] )     Disease of thyroid gland     Hypertension     Mitral valve regurgitation     Myocardial infarction     Obesity hypoventilation syndrome (HCC)     Pain     right hip    Restless leg syndrome     Sleep related hypoxia     Thyroid cancer ([facility] )        Past Surgical History:   Procedure Laterality Date    CHOLECYSTECTOMY      EYE SURGERY      TX ERCP DX COLLECTION SPECIMEN BRUSHING/WASHING N/A 5/30/2017    Procedure: ENDOSCOPIC RETROGRADE CHOLANGIOPANCREATOGRAPHY (ERCP); SPHINCTEROTOMY;  Surgeon: Edda Lane MD;  Location: [facility]; Service: Gastroenterology    REPLACEMENT TOTAL KNEE BILATERAL      SKIN BIOPSY      melanoma of back    STEROID INJECTION HIP Right 12/21/2017    Procedure: TROCHANTERIC BURSA INJECTION RIGHT;  Surgeon: Otis Banegas MD;  Location: [place] [address] MAIN OR;  Service: Pain Management     THORACOTOMY Right     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, for pericardial cyst    THYROIDECTOMY, PARTIAL      For thyroid cancer       Family History   Problem Relation Age of Onset    Cancer Father     Diabetes Sister     Substance Abuse Neg Hx     Mental illness Neg Hx      I have reviewed and agree with the history as documented      Social History   Substance Use Topics    Smoking status: Never Smoker    Smokeless tobacco: Never Used    Alcohol use No        Review of Systems   Constitutional: Negative for fever  Respiratory: Positive for shortness of breath  Cardiovascular: Negative for chest pain  All other systems reviewed and are negative  Physical Exam  ED Triage Vitals   Temperature Pulse Respirations Blood Pressure SpO2   02/22/18 1447 02/22/18 1447 02/22/18 1447 02/22/18 1447 02/22/18 1447   97 9 °F (36 6 °C) 79 18 161/74 99 %      Temp Source Heart Rate Source Patient Position - Orthostatic VS BP Location FiO2 (%)   02/22/18 1447 02/22/18 1447 02/22/18 2301 02/22/18 1447 --   Tympanic Monitor Lying Right arm       Pain Score       02/22/18 2131       8           Orthostatic Vital Signs  Vitals:    02/23/18 0700 02/23/18 0824 02/23/18 2004 02/23/18 2310   BP: 135/65  104/51 100/51   Pulse: 83 90 70 65   Patient Position - Orthostatic VS: Sitting   Lying       Physical Exam   Constitutional: She is oriented to person, place, and time  She appears distressed  HENT:   Mouth/Throat: Oropharynx is clear and moist    Eyes: Pupils are equal, round, and reactive to light  Neck: Normal range of motion  Cardiovascular: Normal rate, regular rhythm and intact distal pulses  Pulmonary/Chest: She is in respiratory distress  She has wheezes  wheezing with decreased air movement bilaterally with moderate respiratory distress   Abdominal: Soft  There is no tenderness  Musculoskeletal: Normal range of motion  Neurological: She is alert and oriented to person, place, and time  Skin: Capillary refill takes less than 2 seconds  She is not diaphoretic  Nursing note and vitals reviewed        ED Medications  Medications   aspirin tablet 325 mg (325 mg Oral Given 2/23/18 0939)   escitalopram (LEXAPRO) tablet 10 mg (10 mg Oral Given 2/23/18 0939)   furosemide (LASIX) tablet 40 mg (40 mg Oral Given 2/23/18 1434)   levothyroxine tablet 125 mcg (125 mcg Oral Given 2/24/18 0617)   lisinopril (ZESTRIL) tablet 10 mg (10 mg Oral Given 2/23/18 0939)   metolazone (ZAROXOLYN) tablet 2 5 mg (2 5 mg Oral Given 2/23/18 0939)   metoprolol tartrate (LOPRESSOR) tablet 25 mg (25 mg Oral Given 2/23/18 1726)   torsemide (DEMADEX) tablet 20 mg (20 mg Oral Given 2/23/18 0939)   heparin (porcine) subcutaneous injection 5,000 Units (5,000 Units Subcutaneous Given 2/24/18 0618)   ipratropium-albuterol (DUO-NEB) 0 5-2 5 mg/3 mL inhalation solution 3 mL (3 mL Nebulization Given 2/24/18 0737)   albuterol inhalation solution 2 5 mg (not administered)   insulin aspart protamine-insulin aspart (NovoLOG 70/30) 100 units/mL subcutaneous injection 45 Units (45 Units Subcutaneous Given 2/23/18 1724)   oxyCODONE-acetaminophen (PERCOCET) 5-325 mg per tablet 1 tablet (1 tablet Oral Given 2/22/18 2132)   bisacodyl (DULCOLAX) EC tablet 5 mg (5 mg Oral Given 2/23/18 0145)   insulin lispro (HumaLOG) 100 units/mL subcutaneous injection 1-6 Units (6 Units Subcutaneous Given 2/23/18 1725)   insulin lispro (HumaLOG) 100 units/mL subcutaneous injection 1-6 Units (6 Units Subcutaneous Given 2/23/18 2129)   methylPREDNISolone sodium succinate (Solu-MEDROL) injection 40 mg (40 mg Intravenous Given 2/24/18 0620)   bisacodyl (DULCOLAX) EC tablet 10 mg (10 mg Oral Given 2/23/18 2127)   dicyclomine (BENTYL) capsule 10 mg (10 mg Oral Given 1/48/43 9760)   folic acid (FOLVITE) tablet 1 mg (1 mg Oral Given 2/23/18 1225)   gabapentin (NEURONTIN) capsule 100 mg (100 mg Oral Given 2/23/18 1225)   polyethylene glycol (MIRALAX) packet 17 g (17 g Oral Given 2/23/18 1224)   potassium chloride (K-DUR,KLOR-CON) CR tablet 20 mEq (20 mEq Oral Given 2/23/18 1225)   pramipexole (MIRAPEX) tablet 1 mg (1 mg Oral Given 2/23/18 2128)   ipratropium-albuterol (DUO-NEB) 0 5-2 5 mg/3 mL inhalation solution 3 mL (3 mL Nebulization Given 2/22/18 1529)   ipratropium-albuterol (DUO-NEB) 0 5-2 5 mg/3 mL inhalation solution 3 mL (3 mL Nebulization Given 2/22/18 1529)   methylPREDNISolone sodium succinate (Solu-MEDROL) injection 125 mg (125 mg Intravenous Given 2/22/18 1529)   albuterol inhalation solution 10 mg (10 mg Nebulization Given 2/22/18 1711)   sodium chloride 0 9 % inhalation solution **AcuDose Override Pull** (3 mL  Given 2/22/18 1711)   potassium chloride 20 mEq in dextrose 5 % 100 mL IVPB (0 mEq Intravenous Stopped 2/22/18 2139)     Followed by   potassium chloride 20 mEq in dextrose 5 % 100 mL IVPB (0 mEq Intravenous Stopped 2/23/18 0128)   insulin lispro (HumaLOG) 100 units/mL subcutaneous injection 8 Units (8 Units Subcutaneous Given 2/22/18 2357)   traMADol (ULTRAM) tablet 50 mg (50 mg Oral Given 2/23/18 0145)   insulin lispro (HumaLOG) 100 units/mL subcutaneous injection 6 Units (6 Units Subcutaneous Given 2/23/18 0148)   insulin lispro (HumaLOG) 100 units/mL subcutaneous injection 5 Units (5 Units Subcutaneous Given 2/23/18 0518)   aluminum-magnesium hydroxide-simethicone (MYLANTA) 200-200-20 mg/5 mL oral suspension 15 mL (15 mL Oral Given 2/23/18 2134)       Diagnostic Studies  Results Reviewed     Procedure Component Value Units Date/Time    Blood culture #1 [[id number]] Collected:  02/22/18 1539    Lab Status:  Preliminary result Specimen:  Blood from Arm, Left Updated:  02/23/18 2101     Blood Culture No Growth at 24 hrs  Blood culture #2 [[id number]] Collected:  02/22/18 1527    Lab Status:  Preliminary result Specimen:  Blood from Arm, Right Updated:  02/23/18 2101     Blood Culture No Growth at 24 hrs      Blood gas, arterial [[id number]]  (Abnormal) Collected:  02/22/18 1706    Lab Status:  Final result Specimen:  Blood, Arterial from Radial, Right Updated:  02/22/18 1735     pH, Arterial 7 417     PH ART TC 7 423     pCO2, Arterial 65 7 (HH) mm Hg      PCO2 (TC) Arterial 64 6 (HH) mm Hg      pO2, Arterial 90 1 mm Hg      PO2 (TC) Arterial 87 9 mm Hg      HCO3, Arterial 41 4 (H) mmol/L      Base Excess, Arterial 14 1 mmol/L      O2 Content, Arterial 16 2 mL/dL      O2 HGB,Arterial  94 1 % SOURCE Radial, Right     AILEEN TEST Yes     Temperature 97 9 Degrees Fehrenheit      Non Vent type BIPAP BIPAP     IPAP 12     EPAP 6     Other FIO2 28 %     UA w Reflex to Microscopic [[id number]]  (Abnormal) Collected:  02/22/18 1648    Lab Status:  Final result Specimen:  Urine from Urine, Clean Catch Updated:  02/22/18 1653     Color, UA Yellow     Clarity, UA Clear     Specific Gravity, UA 1 015     pH, UA 5 5     Leukocytes, UA Negative     Nitrite, UA Negative     Protein, UA Negative mg/dl      Glucose,  (1/2%) (A) mg/dl      Ketones, UA Negative mg/dl      Urobilinogen, UA 0 2 E U /dl      Bilirubin, UA Negative     Blood, UA Negative    Comprehensive metabolic panel [[id number]]  (Abnormal) Collected:  02/22/18 1526    Lab Status:  Final result Specimen:  Blood from Arm, Right Updated:  02/22/18 1627     Sodium 146 (H) mmol/L      Potassium 3 4 (L) mmol/L      Chloride 97 (L) mmol/L      CO2 >45 (HH) mmol/L      Anion Gap -- mmol/L      BUN 19 mg/dL      Creatinine 0 99 mg/dL      Glucose 333 (H) mg/dL      Calcium 9 1 mg/dL      AST 20 U/L      ALT 45 U/L      Alkaline Phosphatase 91 U/L      Total Protein 6 9 g/dL      Albumin 3 3 (L) g/dL      Total Bilirubin 0 30 mg/dL      eGFR 54 ml/min/1 73sq m     Narrative:         National Kidney Disease Education Program recommendations are as follows:  GFR calculation is accurate only with a steady state creatinine  Chronic Kidney disease less than 60 ml/min/1 73 sq  meters  Kidney failure less than 15 ml/min/1 73 sq  meters      B-type natriuretic peptide [[id number]]  (Normal) Collected:  02/22/18 1526    Lab Status:  Final result Specimen:  Blood from Arm, Right Updated:  02/22/18 1625     NT-proBNP 139 pg/mL     Troponin I [[id number]]  (Normal) Collected:  02/22/18 1539    Lab Status:  Final result Specimen:  Blood from Arm, Left Updated:  02/22/18 1608     Troponin I <0 02 ng/mL     Narrative:         [employer] Chemistry analyzer 99% cutoff is > 0 04 ng/mL in network labs    o cTnI 99% cutoff is useful only when applied to patients in the clinical setting of myocardial ischemia  o cTnI 99% cutoff should be interpreted in the context of clinical history, ECG findings and possibly cardiac imaging to establish correct diagnosis  o cTnI 99% cutoff may be suggestive but clearly not indicative of a coronary event without the clinical setting of myocardial ischemia  Lactic acid, plasma [[id number]]  (Normal) Collected:  02/22/18 1526    Lab Status:  Final result Specimen:  Blood from Arm, Right Updated:  02/22/18 1606     LACTIC ACID 1 2 mmol/L     Narrative:         Result may be elevated if tourniquet was used during collection  CBC and differential [[id number]]  (Abnormal) Collected:  02/22/18 1526    Lab Status:  Final result Specimen:  Blood from Arm, Right Updated:  02/22/18 1546     WBC 7 60 Thousand/uL      RBC 3 91 (L) Million/uL      Hemoglobin 12 1 g/dL      Hematocrit 37 5 %      MCV 96 fL      MCH 31 0 pg      MCHC 32 4 g/dL      RDW 16 8 (H) %      MPV 8 2 (L) fL      Platelets 944 Thousands/uL      nRBC 0 /100 WBCs      Neutrophils Relative 72 %      Lymphocytes Relative 19 %      Monocytes Relative 9 %      Eosinophils Relative 1 %      Basophils Relative 0 %      Neutrophils Absolute 5 40 Thousands/µL      Lymphocytes Absolute 1 40 Thousands/µL      Monocytes Absolute 0 70 Thousand/µL      Eosinophils Absolute 0 00 Thousand/µL      Basophils Absolute 0 00 Thousands/µL                  VAS lower limb venous duplex study, complete bilateral   Final Result by Debra Weir DO (02/23 1234)      XR chest 1 view portable   Final Result by Marly Aguirre MD (02/22 1638)      No acute cardiopulmonary disease              Workstation performed: [id number]                    Procedures  Procedures       Phone Contacts  ED Phone Contact    ED Course  ED Course                                MDM  Number of Diagnoses or Management Options  Respiratory distress: Diagnosis management comments: Pulse ox 85% on NC indicating inadequate oxygenation  CXR: NAD as read by me           Amount and/or Complexity of Data Reviewed  Tests in the radiology section of CPT®: ordered and reviewed  Decide to obtain previous medical records or to obtain history from someone other than the patient: yes  Review and summarize past medical records: yes  Discuss the patient with other providers: yes  Independent visualization of images, tracings, or specimens: yes    Patient Progress  Patient progress: stable    The patient presented with a condition in which there was a high probability of imminent or life-threatening deterioration, and critical care services (excluding separately billable procedures) totalled 30-74 minutes  Disposition  Final diagnoses:   Respiratory distress     Time reflects when diagnosis was documented in both MDM as applicable and the Disposition within this note     Time User Action Codes Description Comment    2/22/2018  6:21 PM Lisa Chavez Add [R06 00] Respiratory distress       ED Disposition     ED Disposition Condition Comment    Admit  Case was discussed with  Dr Naseem Das and the patient's admission status was agreed to be Admission Status: inpatient status to the service of Dr Naseem Das           Follow-up Information     Follow up With Specialties Details Why Contact Info    [facility] [address] [place] [address]  Follow up    [phone]        Current Discharge Medication List      CONTINUE these medications which have NOT CHANGED    Details   ascorbic acid (VITAMIN C) 500 mg tablet Take 500 mg by mouth daily      aspirin 325 mg tablet Take 1 tablet by mouth daily      bisacodyl (DULCOLAX) 5 mg EC tablet Take 10 mg by mouth daily at bedtime      COD LIVER OIL PO Take by mouth daily      dextromethorphan-guaiFENesin (ROBITUSSIN DM)  mg/5 mL syrup Take 10 mL by mouth every 4 (four) hours as needed for cough  Qty: 118 mL, Refills: 0    Associated Diagnoses: COPD exacerbation (HCC)      dicyclomine (BENTYL) 10 mg capsule Take 1 capsule by mouth 3 (three) times a day      escitalopram (LEXAPRO) 10 mg tablet Take 1 tablet (10 mg total) by mouth daily  Qty: 30 tablet, Refills: 3    Associated Diagnoses: Anxiety      fentaNYL (DURAGESIC) 25 mcg/hr       folic acid (FOLVITE) 1 mg tablet Take by mouth daily      furosemide (LASIX) 40 mg tablet Take 40 mg by mouth daily after lunch      gabapentin (NEURONTIN) 100 mg capsule       levothyroxine 125 mcg tablet Take 1 tablet by mouth daily      Linaclotide (LINZESS) 72 MCG CAPS Take 72 mcg by mouth daily before breakfast  Qty: 30 capsule, Refills: 6    Associated Diagnoses: Constipation due to opioid therapy      lisinopril (ZESTRIL) 10 mg tablet Take 1 tablet by mouth daily      metolazone (ZAROXOLYN) 2 5 mg tablet Take 1 tablet by mouth      metoprolol tartrate (LOPRESSOR) 25 mg tablet Take 1 tablet by mouth 2 (two) times a day      Multiple Vitamins-Minerals (MULTIVITAMIN ADULT PO) Take 1 tablet by mouth daily      NOVOLOG MIX 70/30 FLEXPEN (70-30) 100 UNIT/ML SUPN 45 Units 2 (two) times a day before meals        polyethylene glycol (MIRALAX) 17 g packet Take 17 g by mouth daily  Qty: 14 each, Refills: 0      pramipexole (MIRAPEX) 1 mg tablet Take 1 tablet by mouth 3 (three) times a day      torsemide (DEMADEX) 20 mg tablet Take 1 tablet (20 mg total) by mouth daily  Qty: 30 tablet, Refills: 3    Associated Diagnoses: Edema, unspecified type      diclofenac sodium (VOLTAREN) 1 % Place on the skin      ipratropium-albuterol (DUONEB) 0 5-2 5 mg/3 mL Inhale 1 each every 4 (four) hours as needed      lactulose 10 g/15 mL Take 15 mL by mouth daily as needed        levalbuterol (XOPENEX) 1 25 mg/0 5 mL nebulizer solution Take 0 5 mL (1 25 mg total) by nebulization 3 (three) times a day for 30 days  Qty: 45 mL, Refills: 0    Associated Diagnoses: COPD exacerbation (HCC)      LORazepam (ATIVAN) 1 mg tablet Take by mouth      !! oxyCODONE-acetaminophen (PERCOCET) 5-325 mg per tablet Take 1 tablet by mouth every 4 (four) hours as needed for moderate pain      !! oxyCODONE-acetaminophen (PERCOCET) 5-325 mg per tablet Take by mouth every 6 (six) hours      potassium chloride (K-DUR,KLOR-CON) 10 mEq tablet Take 2 tablets by mouth daily  Qty: 30 tablet, Refills: 0       !! - Potential duplicate medications found  Please discuss with provider  STOP taking these medications       clotrimazole (MYCELEX) 10 mg jaswant Comments:   Reason for Stopping:               Outpatient Discharge Orders  Oxygen     Ambulatory Referral to Home Health   Standing Status: Future  Standing Exp   Date: 08/23/18         ED Provider  Electronically Signed by           Mary Burt DO  02/24/18 7112 06-Nov-2024

## 2024-11-06 NOTE — ED PROVIDER NOTE - CLINICAL SUMMARY MEDICAL DECISION MAKING FREE TEXT BOX
83yF HTN, A-fib on Eliquis amio, , DM  pw   feeling generally weak all day  ,   later  on had headache, and  BP was elevated . Daughter notes patient's Aldactone 25 mg was discontinued 4 days ago by her cardiologist Jodi   due to increased creatinine patient now only taking her hydrochlorothiazide.  Which patient has been compliant with.  Patient denies any vision changes as of breath abdominal pain  Alert nontoxic, perrl eomi  CVS RRR, Resp CTA no tachypnea no hyperpnea, Abd soft nontender   nondistended ,No le edema,  Alert and oriented.  CN 2-12 intact.  Motor strength and sensory response is symmetric.  CB intact. normal gait with her cane     I  Dr Reyes performed independent interpretation of EKG  -  nsr no stemi no acute ischemia  CT head  no acute pathology  BP improved while in ED without antihypertensive meds   Labs resulted at the time of my review were noted to be within acceptable parameters according to patients baseline  -  except K 5.8,  lokelma given

## 2024-11-06 NOTE — ED PROVIDER NOTE - OBJECTIVE STATEMENT
83-year-old female with past medical history HTN, A-fib on Eliquis, DM presents with complaints of elevated blood pressure and headache.  Reports this past Saturday she stopped Aldactone due to elevated creatinine and since then she has had elevated blood pressure.  Reports today she has had generalized malaise throughout the day today.  Reports she has felt weak all day.  Denies fever/chills, chest pain, shortness of breath, abdominal pain, nausea/vomiting/diarrhea, blurry vision, focal numbness/weakness, photo/phonophobia, neck stiffness, rash, recent travel.  Patient reports her son recently visited her who was sick with an upper respiratory infection.  States she walks with a cane at baseline.  Follows with cardiologist Dr. Zapata. 83-year-old female with past medical history HTN, A-fib on Eliquis, DM presents with complaints of elevated blood pressure and headache.  Reports this past Saturday she stopped taking Aldactone per her docrot due to elevated creatinine and since then she has had elevated blood pressure.  Reports today she has had generalized malaise throughout the day. Reports she has felt weak all day. Endorses history of sciatica and states today she has felt paresthesias to the bilateral lower extremities.  Denies fever/chills, chest pain, shortness of breath, abdominal pain, nausea/vomiting/diarrhea, blurry vision, focal numbness/weakness, photo/phonophobia, neck stiffness, rash, recent travel.  Patient reports her son recently visited her who was sick with an upper respiratory infection.  States she walks with a cane at baseline.  Follows with cardiologist Dr. Zapata.

## 2024-11-07 LAB
CULTURE RESULTS: SIGNIFICANT CHANGE UP
SPECIMEN SOURCE: SIGNIFICANT CHANGE UP

## 2024-11-08 ENCOUNTER — APPOINTMENT (OUTPATIENT)
Dept: CARDIOLOGY | Facility: CLINIC | Age: 84
End: 2024-11-08
Payer: MEDICARE

## 2024-11-08 VITALS
SYSTOLIC BLOOD PRESSURE: 140 MMHG | WEIGHT: 151 LBS | DIASTOLIC BLOOD PRESSURE: 72 MMHG | HEIGHT: 62 IN | BODY MASS INDEX: 27.79 KG/M2 | HEART RATE: 91 BPM

## 2024-11-08 DIAGNOSIS — N18.9 CHRONIC KIDNEY DISEASE, UNSPECIFIED: ICD-10-CM

## 2024-11-08 DIAGNOSIS — I48.0 PAROXYSMAL ATRIAL FIBRILLATION: ICD-10-CM

## 2024-11-08 DIAGNOSIS — I10 ESSENTIAL (PRIMARY) HYPERTENSION: ICD-10-CM

## 2024-11-08 DIAGNOSIS — E87.5 HYPERKALEMIA: ICD-10-CM

## 2024-11-08 DIAGNOSIS — E78.5 HYPERLIPIDEMIA, UNSPECIFIED: ICD-10-CM

## 2024-11-08 DIAGNOSIS — R73.03 PREDIABETES.: ICD-10-CM

## 2024-11-08 PROCEDURE — 93000 ELECTROCARDIOGRAM COMPLETE: CPT

## 2024-11-08 PROCEDURE — 99214 OFFICE O/P EST MOD 30 MIN: CPT | Mod: 25

## 2024-11-11 LAB
ANION GAP SERPL CALC-SCNC: 17 MMOL/L
BUN SERPL-MCNC: 36 MG/DL
CALCIUM SERPL-MCNC: 10.3 MG/DL
CHLORIDE SERPL-SCNC: 97 MMOL/L
CO2 SERPL-SCNC: 22 MMOL/L
CREAT SERPL-MCNC: 2.1 MG/DL
EGFR: 23 ML/MIN/1.73M2
GLUCOSE SERPL-MCNC: 109 MG/DL
POTASSIUM SERPL-SCNC: 5.4 MMOL/L
SODIUM SERPL-SCNC: 136 MMOL/L

## 2024-12-10 ENCOUNTER — APPOINTMENT (OUTPATIENT)
Dept: CARDIOLOGY | Facility: CLINIC | Age: 84
End: 2024-12-10
Payer: MEDICARE

## 2024-12-10 ENCOUNTER — RX RENEWAL (OUTPATIENT)
Age: 84
End: 2024-12-10

## 2024-12-10 DIAGNOSIS — E78.5 HYPERLIPIDEMIA, UNSPECIFIED: ICD-10-CM

## 2024-12-10 DIAGNOSIS — N18.9 CHRONIC KIDNEY DISEASE, UNSPECIFIED: ICD-10-CM

## 2024-12-10 DIAGNOSIS — R73.03 PREDIABETES.: ICD-10-CM

## 2024-12-10 DIAGNOSIS — E87.5 HYPERKALEMIA: ICD-10-CM

## 2024-12-10 DIAGNOSIS — I48.0 PAROXYSMAL ATRIAL FIBRILLATION: ICD-10-CM

## 2024-12-10 DIAGNOSIS — I10 ESSENTIAL (PRIMARY) HYPERTENSION: ICD-10-CM

## 2024-12-10 PROCEDURE — 99214 OFFICE O/P EST MOD 30 MIN: CPT | Mod: 25

## 2024-12-10 PROCEDURE — 93000 ELECTROCARDIOGRAM COMPLETE: CPT

## 2024-12-10 RX ORDER — MONTELUKAST 10 MG/1
10 TABLET, FILM COATED ORAL
Refills: 0 | Status: ACTIVE | COMMUNITY

## 2024-12-10 RX ORDER — FLUTICASONE FUROATE, UMECLIDINIUM BROMIDE AND VILANTEROL TRIFENATATE 100; 62.5; 25 UG/1; UG/1; UG/1
100-62.5-25 POWDER RESPIRATORY (INHALATION)
Refills: 0 | Status: ACTIVE | COMMUNITY

## 2024-12-10 RX ORDER — CLONIDINE HYDROCHLORIDE 0.1 MG/1
0.1 TABLET ORAL
Refills: 0 | Status: ACTIVE | COMMUNITY

## 2024-12-19 RX ORDER — LOSARTAN POTASSIUM 50 MG/1
50 TABLET, FILM COATED ORAL
Qty: 90 | Refills: 0 | Status: DISCONTINUED | COMMUNITY
Start: 2024-12-10 | End: 2024-12-19

## 2024-12-23 RX ORDER — ISOSORBIDE DINITRATE 30 MG/1
30 TABLET ORAL EVERY 8 HOURS
Qty: 270 | Refills: 0 | Status: ACTIVE | COMMUNITY
Start: 2024-12-19 | End: 1900-01-01

## 2025-01-28 ENCOUNTER — NON-APPOINTMENT (OUTPATIENT)
Age: 85
End: 2025-01-28

## 2025-01-28 ENCOUNTER — APPOINTMENT (OUTPATIENT)
Dept: CARDIOLOGY | Facility: CLINIC | Age: 85
End: 2025-01-28
Payer: MEDICARE

## 2025-01-28 VITALS
HEART RATE: 77 BPM | HEIGHT: 62 IN | BODY MASS INDEX: 27.42 KG/M2 | DIASTOLIC BLOOD PRESSURE: 72 MMHG | SYSTOLIC BLOOD PRESSURE: 142 MMHG | WEIGHT: 149 LBS

## 2025-01-28 DIAGNOSIS — I48.0 PAROXYSMAL ATRIAL FIBRILLATION: ICD-10-CM

## 2025-01-28 DIAGNOSIS — E78.5 HYPERLIPIDEMIA, UNSPECIFIED: ICD-10-CM

## 2025-01-28 DIAGNOSIS — R73.03 PREDIABETES.: ICD-10-CM

## 2025-01-28 DIAGNOSIS — R20.0 ANESTHESIA OF SKIN: ICD-10-CM

## 2025-01-28 DIAGNOSIS — E87.5 HYPERKALEMIA: ICD-10-CM

## 2025-01-28 DIAGNOSIS — I10 ESSENTIAL (PRIMARY) HYPERTENSION: ICD-10-CM

## 2025-01-28 DIAGNOSIS — N18.9 CHRONIC KIDNEY DISEASE, UNSPECIFIED: ICD-10-CM

## 2025-01-28 DIAGNOSIS — R20.2 ANESTHESIA OF SKIN: ICD-10-CM

## 2025-01-28 PROCEDURE — 99214 OFFICE O/P EST MOD 30 MIN: CPT | Mod: 25

## 2025-01-28 PROCEDURE — 93000 ELECTROCARDIOGRAM COMPLETE: CPT

## 2025-01-28 RX ORDER — BUMETANIDE 1 MG/1
1 TABLET ORAL
Refills: 0 | Status: ACTIVE | COMMUNITY

## 2025-02-11 ENCOUNTER — APPOINTMENT (OUTPATIENT)
Dept: CARDIOLOGY | Facility: CLINIC | Age: 85
End: 2025-02-11

## 2025-03-11 ENCOUNTER — APPOINTMENT (OUTPATIENT)
Dept: CARDIOLOGY | Facility: CLINIC | Age: 85
End: 2025-03-11
Payer: MEDICARE

## 2025-03-11 VITALS
HEIGHT: 62 IN | WEIGHT: 150 LBS | SYSTOLIC BLOOD PRESSURE: 112 MMHG | BODY MASS INDEX: 27.6 KG/M2 | HEART RATE: 58 BPM | DIASTOLIC BLOOD PRESSURE: 70 MMHG

## 2025-03-11 DIAGNOSIS — I48.0 PAROXYSMAL ATRIAL FIBRILLATION: ICD-10-CM

## 2025-03-11 DIAGNOSIS — N18.9 CHRONIC KIDNEY DISEASE, UNSPECIFIED: ICD-10-CM

## 2025-03-11 DIAGNOSIS — E78.5 HYPERLIPIDEMIA, UNSPECIFIED: ICD-10-CM

## 2025-03-11 DIAGNOSIS — I10 ESSENTIAL (PRIMARY) HYPERTENSION: ICD-10-CM

## 2025-03-11 PROCEDURE — 99214 OFFICE O/P EST MOD 30 MIN: CPT | Mod: 25

## 2025-03-11 PROCEDURE — 93000 ELECTROCARDIOGRAM COMPLETE: CPT

## 2025-03-11 RX ORDER — FEXOFENADINE HYDROCHLORIDE 180 MG/1
180 TABLET ORAL DAILY
Refills: 0 | Status: ACTIVE | COMMUNITY

## 2025-04-07 ENCOUNTER — NON-APPOINTMENT (OUTPATIENT)
Age: 85
End: 2025-04-07

## 2025-04-29 ENCOUNTER — APPOINTMENT (OUTPATIENT)
Dept: CARDIOLOGY | Facility: CLINIC | Age: 85
End: 2025-04-29
Payer: MEDICARE

## 2025-04-29 VITALS — HEIGHT: 62 IN | BODY MASS INDEX: 27.05 KG/M2 | WEIGHT: 147 LBS

## 2025-04-29 VITALS — DIASTOLIC BLOOD PRESSURE: 90 MMHG | SYSTOLIC BLOOD PRESSURE: 140 MMHG

## 2025-04-29 DIAGNOSIS — N18.9 CHRONIC KIDNEY DISEASE, UNSPECIFIED: ICD-10-CM

## 2025-04-29 DIAGNOSIS — I10 ESSENTIAL (PRIMARY) HYPERTENSION: ICD-10-CM

## 2025-04-29 DIAGNOSIS — E78.5 HYPERLIPIDEMIA, UNSPECIFIED: ICD-10-CM

## 2025-04-29 DIAGNOSIS — R73.03 PREDIABETES.: ICD-10-CM

## 2025-04-29 PROCEDURE — 99214 OFFICE O/P EST MOD 30 MIN: CPT | Mod: 25

## 2025-04-29 PROCEDURE — 93000 ELECTROCARDIOGRAM COMPLETE: CPT

## 2025-04-29 RX ORDER — ALENDRONATE SODIUM 70 MG/1
70 TABLET ORAL
Refills: 0 | Status: ACTIVE | COMMUNITY
Start: 2025-04-29

## 2025-05-31 ENCOUNTER — APPOINTMENT (OUTPATIENT)
Dept: ORTHOPEDIC SURGERY | Facility: CLINIC | Age: 85
End: 2025-05-31
Payer: MEDICARE

## 2025-05-31 DIAGNOSIS — S82.831A OTHER FRACTURE OF UPPER AND LOWER END OF RIGHT FIBULA, INITIAL ENCOUNTER FOR CLOSED FRACTURE: ICD-10-CM

## 2025-05-31 PROCEDURE — 99203 OFFICE O/P NEW LOW 30 MIN: CPT | Mod: 25

## 2025-06-16 ENCOUNTER — APPOINTMENT (OUTPATIENT)
Facility: CLINIC | Age: 85
End: 2025-06-16
Payer: MEDICARE

## 2025-06-16 PROCEDURE — 99204 OFFICE O/P NEW MOD 45 MIN: CPT | Mod: 57

## 2025-06-16 PROCEDURE — 27786 TREATMENT OF ANKLE FRACTURE: CPT | Mod: RT

## 2025-06-16 PROCEDURE — 73610 X-RAY EXAM OF ANKLE: CPT | Mod: RT

## 2025-07-07 ENCOUNTER — RESULT CHARGE (OUTPATIENT)
Age: 85
End: 2025-07-07

## 2025-07-07 ENCOUNTER — APPOINTMENT (OUTPATIENT)
Facility: CLINIC | Age: 85
End: 2025-07-07
Payer: MEDICARE

## 2025-07-07 PROCEDURE — 73610 X-RAY EXAM OF ANKLE: CPT | Mod: RT

## 2025-07-07 PROCEDURE — 99024 POSTOP FOLLOW-UP VISIT: CPT

## 2025-07-29 ENCOUNTER — APPOINTMENT (OUTPATIENT)
Dept: CARDIOLOGY | Facility: CLINIC | Age: 85
End: 2025-07-29
Payer: MEDICARE

## 2025-07-29 VITALS
SYSTOLIC BLOOD PRESSURE: 140 MMHG | BODY MASS INDEX: 25.76 KG/M2 | WEIGHT: 140 LBS | HEART RATE: 56 BPM | DIASTOLIC BLOOD PRESSURE: 76 MMHG | HEIGHT: 62 IN

## 2025-07-29 VITALS — HEIGHT: 62 IN | WEIGHT: 140 LBS | BODY MASS INDEX: 25.76 KG/M2

## 2025-07-29 DIAGNOSIS — R20.0 ANESTHESIA OF SKIN: ICD-10-CM

## 2025-07-29 DIAGNOSIS — I48.0 PAROXYSMAL ATRIAL FIBRILLATION: ICD-10-CM

## 2025-07-29 DIAGNOSIS — E03.9 HYPOTHYROIDISM, UNSPECIFIED: ICD-10-CM

## 2025-07-29 DIAGNOSIS — Z00.00 ENCOUNTER FOR GENERAL ADULT MEDICAL EXAMINATION W/OUT ABNORMAL FINDINGS: ICD-10-CM

## 2025-07-29 DIAGNOSIS — I10 ESSENTIAL (PRIMARY) HYPERTENSION: ICD-10-CM

## 2025-07-29 DIAGNOSIS — E78.5 HYPERLIPIDEMIA, UNSPECIFIED: ICD-10-CM

## 2025-07-29 DIAGNOSIS — N18.9 CHRONIC KIDNEY DISEASE, UNSPECIFIED: ICD-10-CM

## 2025-07-29 DIAGNOSIS — R20.2 ANESTHESIA OF SKIN: ICD-10-CM

## 2025-07-29 DIAGNOSIS — R73.03 PREDIABETES.: ICD-10-CM

## 2025-07-29 PROCEDURE — 99213 OFFICE O/P EST LOW 20 MIN: CPT | Mod: 25

## 2025-07-29 PROCEDURE — 93000 ELECTROCARDIOGRAM COMPLETE: CPT

## 2025-07-29 RX ORDER — LEVOTHYROXINE SODIUM 0.1 MG/1
100 TABLET ORAL DAILY
Qty: 90 | Refills: 3 | Status: ACTIVE | COMMUNITY
Start: 2025-07-29 | End: 1900-01-01

## 2025-08-04 ENCOUNTER — RESULT CHARGE (OUTPATIENT)
Age: 85
End: 2025-08-04

## 2025-08-04 ENCOUNTER — APPOINTMENT (OUTPATIENT)
Facility: CLINIC | Age: 85
End: 2025-08-04
Payer: MEDICARE

## 2025-08-04 DIAGNOSIS — S82.831A OTHER FRACTURE OF UPPER AND LOWER END OF RIGHT FIBULA, INITIAL ENCOUNTER FOR CLOSED FRACTURE: ICD-10-CM

## 2025-08-04 PROCEDURE — 99024 POSTOP FOLLOW-UP VISIT: CPT

## 2025-08-04 PROCEDURE — 73610 X-RAY EXAM OF ANKLE: CPT | Mod: RT

## 2025-08-05 ENCOUNTER — APPOINTMENT (OUTPATIENT)
Dept: PAIN MANAGEMENT | Facility: CLINIC | Age: 85
End: 2025-08-05

## 2025-08-05 ENCOUNTER — APPOINTMENT (OUTPATIENT)
Dept: PAIN MANAGEMENT | Facility: CLINIC | Age: 85
End: 2025-08-05
Payer: MEDICARE

## 2025-08-05 DIAGNOSIS — M54.16 RADICULOPATHY, LUMBAR REGION: ICD-10-CM

## 2025-08-05 DIAGNOSIS — M25.559 PAIN IN UNSPECIFIED HIP: ICD-10-CM

## 2025-08-05 PROCEDURE — 99204 OFFICE O/P NEW MOD 45 MIN: CPT

## 2025-08-05 PROCEDURE — 73522 X-RAY EXAM HIPS BI 3-4 VIEWS: CPT

## 2025-08-05 RX ORDER — GABAPENTIN 100 MG/1
100 CAPSULE ORAL 3 TIMES DAILY
Qty: 90 | Refills: 2 | Status: ACTIVE | COMMUNITY
Start: 2025-08-05 | End: 1900-01-01

## 2025-08-24 ENCOUNTER — INPATIENT (INPATIENT)
Facility: HOSPITAL | Age: 85
LOS: 5 days | Discharge: HOME | DRG: 196 | End: 2025-08-30
Attending: FAMILY MEDICINE | Admitting: INTERNAL MEDICINE
Payer: MEDICARE

## 2025-08-24 VITALS
OXYGEN SATURATION: 99 % | SYSTOLIC BLOOD PRESSURE: 180 MMHG | HEART RATE: 73 BPM | TEMPERATURE: 98 F | RESPIRATION RATE: 20 BRPM | WEIGHT: 139.99 LBS | HEIGHT: 62 IN | DIASTOLIC BLOOD PRESSURE: 78 MMHG

## 2025-08-24 DIAGNOSIS — S52.209A UNSPECIFIED FRACTURE OF SHAFT OF UNSPECIFIED ULNA, INITIAL ENCOUNTER FOR CLOSED FRACTURE: Chronic | ICD-10-CM

## 2025-08-24 DIAGNOSIS — J18.9 PNEUMONIA, UNSPECIFIED ORGANISM: ICD-10-CM

## 2025-08-24 LAB
ALBUMIN SERPL ELPH-MCNC: 3.6 G/DL — SIGNIFICANT CHANGE UP (ref 3.5–5.2)
ALP SERPL-CCNC: 116 U/L — HIGH (ref 30–115)
ALT FLD-CCNC: 49 U/L — HIGH (ref 0–41)
ANION GAP SERPL CALC-SCNC: 12 MMOL/L — SIGNIFICANT CHANGE UP (ref 7–14)
AST SERPL-CCNC: 37 U/L — SIGNIFICANT CHANGE UP (ref 0–41)
BASOPHILS # BLD AUTO: 0.05 K/UL — SIGNIFICANT CHANGE UP (ref 0–0.2)
BASOPHILS NFR BLD AUTO: 0.8 % — SIGNIFICANT CHANGE UP (ref 0–2)
BILIRUB SERPL-MCNC: 0.6 MG/DL — SIGNIFICANT CHANGE UP (ref 0.2–1.2)
BUN SERPL-MCNC: 26 MG/DL — HIGH (ref 10–20)
CALCIUM SERPL-MCNC: 8.9 MG/DL — SIGNIFICANT CHANGE UP (ref 8.4–10.5)
CHLORIDE SERPL-SCNC: 98 MMOL/L — SIGNIFICANT CHANGE UP (ref 98–110)
CO2 SERPL-SCNC: 28 MMOL/L — SIGNIFICANT CHANGE UP (ref 17–32)
CREAT SERPL-MCNC: 1.1 MG/DL — SIGNIFICANT CHANGE UP (ref 0.7–1.5)
EGFR: 50 ML/MIN/1.73M2 — LOW
EGFR: 50 ML/MIN/1.73M2 — LOW
EOSINOPHIL # BLD AUTO: 0.18 K/UL — SIGNIFICANT CHANGE UP (ref 0–0.5)
EOSINOPHIL NFR BLD AUTO: 3 % — SIGNIFICANT CHANGE UP (ref 0–6)
FLUAV AG NPH QL: SIGNIFICANT CHANGE UP
FLUBV AG NPH QL: SIGNIFICANT CHANGE UP
GAS PNL BLDV: SIGNIFICANT CHANGE UP
GLUCOSE SERPL-MCNC: 95 MG/DL — SIGNIFICANT CHANGE UP (ref 70–99)
HCT VFR BLD CALC: 34.2 % — LOW (ref 34.5–45)
HGB BLD-MCNC: 10.7 G/DL — LOW (ref 11.5–15.5)
IMM GRANULOCYTES # BLD AUTO: 0.04 K/UL — SIGNIFICANT CHANGE UP (ref 0–0.07)
IMM GRANULOCYTES NFR BLD AUTO: 0.7 % — SIGNIFICANT CHANGE UP (ref 0–0.9)
LYMPHOCYTES # BLD AUTO: 0.88 K/UL — LOW (ref 1–3.3)
LYMPHOCYTES NFR BLD AUTO: 14.4 % — SIGNIFICANT CHANGE UP (ref 13–44)
MCHC RBC-ENTMCNC: 23.3 PG — LOW (ref 27–34)
MCHC RBC-ENTMCNC: 31.3 G/DL — LOW (ref 32–36)
MCV RBC AUTO: 74.3 FL — LOW (ref 80–100)
MONOCYTES # BLD AUTO: 0.57 K/UL — SIGNIFICANT CHANGE UP (ref 0–0.9)
MONOCYTES NFR BLD AUTO: 9.3 % — SIGNIFICANT CHANGE UP (ref 2–14)
NEUTROPHILS # BLD AUTO: 4.38 K/UL — SIGNIFICANT CHANGE UP (ref 1.8–7.4)
NEUTROPHILS NFR BLD AUTO: 71.8 % — SIGNIFICANT CHANGE UP (ref 43–77)
NRBC # BLD AUTO: 0 K/UL — SIGNIFICANT CHANGE UP (ref 0–0)
NRBC # FLD: 0 K/UL — SIGNIFICANT CHANGE UP (ref 0–0)
NRBC BLD AUTO-RTO: 0 /100 WBCS — SIGNIFICANT CHANGE UP (ref 0–0)
NT-PROBNP SERPL-SCNC: 8695 PG/ML — HIGH (ref 0–300)
PLATELET # BLD AUTO: 389 K/UL — SIGNIFICANT CHANGE UP (ref 150–400)
PMV BLD: 8.8 FL — SIGNIFICANT CHANGE UP (ref 7–13)
POTASSIUM SERPL-MCNC: 4 MMOL/L — SIGNIFICANT CHANGE UP (ref 3.5–5)
POTASSIUM SERPL-SCNC: 4 MMOL/L — SIGNIFICANT CHANGE UP (ref 3.5–5)
PROT SERPL-MCNC: 6.4 G/DL — SIGNIFICANT CHANGE UP (ref 6–8)
RBC # BLD: 4.6 M/UL — SIGNIFICANT CHANGE UP (ref 3.8–5.2)
RBC # FLD: 20.2 % — HIGH (ref 10.3–14.5)
RSV RNA NPH QL NAA+NON-PROBE: SIGNIFICANT CHANGE UP
SARS-COV-2 RNA SPEC QL NAA+PROBE: SIGNIFICANT CHANGE UP
SODIUM SERPL-SCNC: 138 MMOL/L — SIGNIFICANT CHANGE UP (ref 135–146)
SOURCE RESPIRATORY: SIGNIFICANT CHANGE UP
TROPONIN T, HIGH SENSITIVITY RESULT: 16 NG/L — HIGH (ref 6–13)
TROPONIN T, HIGH SENSITIVITY RESULT: 22 NG/L — HIGH (ref 6–13)
WBC # BLD: 6.1 K/UL — SIGNIFICANT CHANGE UP (ref 3.8–10.5)
WBC # FLD AUTO: 6.1 K/UL — SIGNIFICANT CHANGE UP (ref 3.8–10.5)

## 2025-08-24 PROCEDURE — 85025 COMPLETE CBC W/AUTO DIFF WBC: CPT

## 2025-08-24 PROCEDURE — 71045 X-RAY EXAM CHEST 1 VIEW: CPT | Mod: 26

## 2025-08-24 PROCEDURE — 84100 ASSAY OF PHOSPHORUS: CPT

## 2025-08-24 PROCEDURE — 71275 CT ANGIOGRAPHY CHEST: CPT | Mod: 26

## 2025-08-24 PROCEDURE — 87637 SARSCOV2&INF A&B&RSV AMP PRB: CPT

## 2025-08-24 PROCEDURE — 93005 ELECTROCARDIOGRAM TRACING: CPT

## 2025-08-24 PROCEDURE — 84145 PROCALCITONIN (PCT): CPT

## 2025-08-24 PROCEDURE — 94760 N-INVAS EAR/PLS OXIMETRY 1: CPT

## 2025-08-24 PROCEDURE — 83735 ASSAY OF MAGNESIUM: CPT

## 2025-08-24 PROCEDURE — 99285 EMERGENCY DEPT VISIT HI MDM: CPT

## 2025-08-24 PROCEDURE — 71045 X-RAY EXAM CHEST 1 VIEW: CPT

## 2025-08-24 PROCEDURE — 36415 COLL VENOUS BLD VENIPUNCTURE: CPT

## 2025-08-24 PROCEDURE — 80061 LIPID PANEL: CPT

## 2025-08-24 PROCEDURE — 93306 TTE W/DOPPLER COMPLETE: CPT

## 2025-08-24 PROCEDURE — 97162 PT EVAL MOD COMPLEX 30 MIN: CPT | Mod: GP

## 2025-08-24 PROCEDURE — 99223 1ST HOSP IP/OBS HIGH 75: CPT

## 2025-08-24 PROCEDURE — 83036 HEMOGLOBIN GLYCOSYLATED A1C: CPT

## 2025-08-24 PROCEDURE — 80053 COMPREHEN METABOLIC PANEL: CPT

## 2025-08-24 PROCEDURE — 93010 ELECTROCARDIOGRAM REPORT: CPT | Mod: 76

## 2025-08-24 RX ORDER — APIXABAN 2.5 MG/1
5 TABLET, FILM COATED ORAL EVERY 12 HOURS
Refills: 0 | Status: DISCONTINUED | OUTPATIENT
Start: 2025-08-24 | End: 2025-08-30

## 2025-08-24 RX ORDER — ACETAMINOPHEN 500 MG/5ML
650 LIQUID (ML) ORAL EVERY 6 HOURS
Refills: 0 | Status: DISCONTINUED | OUTPATIENT
Start: 2025-08-24 | End: 2025-08-30

## 2025-08-24 RX ORDER — AMIODARONE HYDROCHLORIDE 50 MG/ML
100 INJECTION, SOLUTION INTRAVENOUS DAILY
Refills: 0 | Status: DISCONTINUED | OUTPATIENT
Start: 2025-08-24 | End: 2025-08-30

## 2025-08-24 RX ORDER — FUROSEMIDE 10 MG/ML
20 INJECTION INTRAMUSCULAR; INTRAVENOUS ONCE
Refills: 0 | Status: COMPLETED | OUTPATIENT
Start: 2025-08-24 | End: 2025-08-24

## 2025-08-24 RX ORDER — ALENDRONATE SODIUM 70 MG/1
1 TABLET ORAL
Refills: 0 | DISCHARGE

## 2025-08-24 RX ORDER — FLUTICASONE FUROATE, UMECLIDINIUM BROMIDE AND VILANTEROL TRIFENATATE 100; 62.5; 25 UG/1; UG/1; UG/1
1 POWDER RESPIRATORY (INHALATION)
Refills: 0 | DISCHARGE

## 2025-08-24 RX ORDER — MONTELUKAST SODIUM 10 MG/1
1 TABLET ORAL
Refills: 0 | DISCHARGE

## 2025-08-24 RX ORDER — BUMETANIDE 1 MG/1
1 TABLET ORAL
Refills: 0 | DISCHARGE

## 2025-08-24 RX ORDER — CEFTRIAXONE 500 MG/1
1000 INJECTION, POWDER, FOR SOLUTION INTRAMUSCULAR; INTRAVENOUS EVERY 24 HOURS
Refills: 0 | Status: COMPLETED | OUTPATIENT
Start: 2025-08-24 | End: 2025-08-28

## 2025-08-24 RX ORDER — ISOSORBDIE DINITRATE 30 MG/1
30 TABLET ORAL THREE TIMES A DAY
Refills: 0 | Status: DISCONTINUED | OUTPATIENT
Start: 2025-08-24 | End: 2025-08-30

## 2025-08-24 RX ORDER — LEVOTHYROXINE SODIUM 300 MCG
100 TABLET ORAL DAILY
Refills: 0 | Status: DISCONTINUED | OUTPATIENT
Start: 2025-08-24 | End: 2025-08-30

## 2025-08-24 RX ORDER — ISOSORBDIE DINITRATE 30 MG/1
1 TABLET ORAL
Refills: 0 | DISCHARGE

## 2025-08-24 RX ORDER — AMIODARONE HYDROCHLORIDE 50 MG/ML
1 INJECTION, SOLUTION INTRAVENOUS
Refills: 0 | DISCHARGE

## 2025-08-24 RX ORDER — ATORVASTATIN CALCIUM 80 MG/1
20 TABLET, FILM COATED ORAL AT BEDTIME
Refills: 0 | Status: DISCONTINUED | OUTPATIENT
Start: 2025-08-24 | End: 2025-08-30

## 2025-08-24 RX ORDER — BUMETANIDE 1 MG/1
1 TABLET ORAL DAILY
Refills: 0 | Status: DISCONTINUED | OUTPATIENT
Start: 2025-08-24 | End: 2025-08-25

## 2025-08-24 RX ORDER — GABAPENTIN 400 MG/1
100 CAPSULE ORAL THREE TIMES A DAY
Refills: 0 | Status: DISCONTINUED | OUTPATIENT
Start: 2025-08-24 | End: 2025-08-26

## 2025-08-24 RX ORDER — APIXABAN 2.5 MG/1
1 TABLET, FILM COATED ORAL
Refills: 0 | DISCHARGE

## 2025-08-24 RX ORDER — LEVOTHYROXINE SODIUM 300 MCG
1 TABLET ORAL
Refills: 0 | DISCHARGE

## 2025-08-24 RX ORDER — METOPROLOL SUCCINATE 50 MG/1
1 TABLET, EXTENDED RELEASE ORAL
Refills: 0 | DISCHARGE

## 2025-08-24 RX ORDER — DOXYCYCLINE HYCLATE 100 MG
100 TABLET ORAL EVERY 12 HOURS
Refills: 0 | Status: DISCONTINUED | OUTPATIENT
Start: 2025-08-24 | End: 2025-08-30

## 2025-08-24 RX ORDER — MONTELUKAST SODIUM 10 MG/1
10 TABLET ORAL DAILY
Refills: 0 | Status: DISCONTINUED | OUTPATIENT
Start: 2025-08-24 | End: 2025-08-30

## 2025-08-24 RX ORDER — CEFTRIAXONE 500 MG/1
2000 INJECTION, POWDER, FOR SOLUTION INTRAMUSCULAR; INTRAVENOUS ONCE
Refills: 0 | Status: COMPLETED | OUTPATIENT
Start: 2025-08-24 | End: 2025-08-24

## 2025-08-24 RX ORDER — METOPROLOL SUCCINATE 50 MG/1
100 TABLET, EXTENDED RELEASE ORAL DAILY
Refills: 0 | Status: DISCONTINUED | OUTPATIENT
Start: 2025-08-24 | End: 2025-08-30

## 2025-08-24 RX ADMIN — Medication 100 MILLIGRAM(S): at 21:56

## 2025-08-24 RX ADMIN — ATORVASTATIN CALCIUM 20 MILLIGRAM(S): 80 TABLET, FILM COATED ORAL at 21:57

## 2025-08-24 RX ADMIN — FUROSEMIDE 20 MILLIGRAM(S): 10 INJECTION INTRAMUSCULAR; INTRAVENOUS at 18:18

## 2025-08-24 RX ADMIN — GABAPENTIN 100 MILLIGRAM(S): 400 CAPSULE ORAL at 21:57

## 2025-08-24 RX ADMIN — FUROSEMIDE 20 MILLIGRAM(S): 10 INJECTION INTRAMUSCULAR; INTRAVENOUS at 16:38

## 2025-08-24 RX ADMIN — CEFTRIAXONE 100 MILLIGRAM(S): 500 INJECTION, POWDER, FOR SOLUTION INTRAMUSCULAR; INTRAVENOUS at 16:41

## 2025-08-24 RX ADMIN — Medication 100 MILLIGRAM(S): at 18:24

## 2025-08-24 RX ADMIN — Medication 1000 MILLILITER(S): at 14:01

## 2025-08-24 RX ADMIN — Medication 100 MILLIGRAM(S): at 18:18

## 2025-08-24 RX ADMIN — CEFTRIAXONE 100 MILLIGRAM(S): 500 INJECTION, POWDER, FOR SOLUTION INTRAMUSCULAR; INTRAVENOUS at 22:14

## 2025-08-24 RX ADMIN — METOPROLOL SUCCINATE 100 MILLIGRAM(S): 50 TABLET, EXTENDED RELEASE ORAL at 18:18

## 2025-08-25 ENCOUNTER — RESULT REVIEW (OUTPATIENT)
Age: 85
End: 2025-08-25

## 2025-08-25 LAB
A1C WITH ESTIMATED AVERAGE GLUCOSE RESULT: 5.9 % — HIGH (ref 4–5.6)
ALBUMIN SERPL ELPH-MCNC: 3.2 G/DL — LOW (ref 3.5–5.2)
ALP SERPL-CCNC: 114 U/L — SIGNIFICANT CHANGE UP (ref 30–115)
ALT FLD-CCNC: 46 U/L — HIGH (ref 0–41)
ANION GAP SERPL CALC-SCNC: 13 MMOL/L — SIGNIFICANT CHANGE UP (ref 7–14)
AST SERPL-CCNC: 38 U/L — SIGNIFICANT CHANGE UP (ref 0–41)
BASOPHILS # BLD AUTO: 0.04 K/UL — SIGNIFICANT CHANGE UP (ref 0–0.2)
BASOPHILS NFR BLD AUTO: 0.5 % — SIGNIFICANT CHANGE UP (ref 0–2)
BILIRUB SERPL-MCNC: 0.4 MG/DL — SIGNIFICANT CHANGE UP (ref 0.2–1.2)
BUN SERPL-MCNC: 19 MG/DL — SIGNIFICANT CHANGE UP (ref 10–20)
CALCIUM SERPL-MCNC: 9 MG/DL — SIGNIFICANT CHANGE UP (ref 8.4–10.5)
CHLORIDE SERPL-SCNC: 96 MMOL/L — LOW (ref 98–110)
CHOLEST SERPL-MCNC: 119 MG/DL — SIGNIFICANT CHANGE UP
CO2 SERPL-SCNC: 31 MMOL/L — SIGNIFICANT CHANGE UP (ref 17–32)
CREAT SERPL-MCNC: 1.2 MG/DL — SIGNIFICANT CHANGE UP (ref 0.7–1.5)
EGFR: 45 ML/MIN/1.73M2 — LOW
EGFR: 45 ML/MIN/1.73M2 — LOW
EOSINOPHIL # BLD AUTO: 0.15 K/UL — SIGNIFICANT CHANGE UP (ref 0–0.5)
EOSINOPHIL NFR BLD AUTO: 1.7 % — SIGNIFICANT CHANGE UP (ref 0–6)
ESTIMATED AVERAGE GLUCOSE: 123 MG/DL — HIGH (ref 68–114)
GLUCOSE SERPL-MCNC: 109 MG/DL — HIGH (ref 70–99)
HCT VFR BLD CALC: 37.6 % — SIGNIFICANT CHANGE UP (ref 34.5–45)
HDLC SERPL-MCNC: 38 MG/DL — LOW
HGB BLD-MCNC: 11.8 G/DL — SIGNIFICANT CHANGE UP (ref 11.5–15.5)
IMM GRANULOCYTES # BLD AUTO: 0.04 K/UL — SIGNIFICANT CHANGE UP (ref 0–0.07)
IMM GRANULOCYTES NFR BLD AUTO: 0.5 % — SIGNIFICANT CHANGE UP (ref 0–0.9)
LDLC SERPL-MCNC: 65 MG/DL — SIGNIFICANT CHANGE UP
LIPID PNL WITH DIRECT LDL SERPL: 65 MG/DL — SIGNIFICANT CHANGE UP
LYMPHOCYTES # BLD AUTO: 0.74 K/UL — LOW (ref 1–3.3)
LYMPHOCYTES NFR BLD AUTO: 8.5 % — LOW (ref 13–44)
MAGNESIUM SERPL-MCNC: 1.9 MG/DL — SIGNIFICANT CHANGE UP (ref 1.8–2.4)
MCHC RBC-ENTMCNC: 23.5 PG — LOW (ref 27–34)
MCHC RBC-ENTMCNC: 31.4 G/DL — LOW (ref 32–36)
MCV RBC AUTO: 74.9 FL — LOW (ref 80–100)
MONOCYTES # BLD AUTO: 0.87 K/UL — SIGNIFICANT CHANGE UP (ref 0–0.9)
MONOCYTES NFR BLD AUTO: 10 % — SIGNIFICANT CHANGE UP (ref 2–14)
NEUTROPHILS # BLD AUTO: 6.82 K/UL — SIGNIFICANT CHANGE UP (ref 1.8–7.4)
NEUTROPHILS NFR BLD AUTO: 78.8 % — HIGH (ref 43–77)
NONHDLC SERPL-MCNC: 81 MG/DL — SIGNIFICANT CHANGE UP
NRBC # BLD AUTO: 0 K/UL — SIGNIFICANT CHANGE UP (ref 0–0)
NRBC # FLD: 0 K/UL — SIGNIFICANT CHANGE UP (ref 0–0)
NRBC BLD AUTO-RTO: 0 /100 WBCS — SIGNIFICANT CHANGE UP (ref 0–0)
PHOSPHATE SERPL-MCNC: 4.2 MG/DL — SIGNIFICANT CHANGE UP (ref 2.1–4.9)
PLATELET # BLD AUTO: 416 K/UL — HIGH (ref 150–400)
PMV BLD: 9 FL — SIGNIFICANT CHANGE UP (ref 7–13)
POTASSIUM SERPL-MCNC: 3.7 MMOL/L — SIGNIFICANT CHANGE UP (ref 3.5–5)
POTASSIUM SERPL-SCNC: 3.7 MMOL/L — SIGNIFICANT CHANGE UP (ref 3.5–5)
PROT SERPL-MCNC: 5.5 G/DL — LOW (ref 6–8)
RBC # BLD: 5.02 M/UL — SIGNIFICANT CHANGE UP (ref 3.8–5.2)
RBC # FLD: 20.8 % — HIGH (ref 10.3–14.5)
SODIUM SERPL-SCNC: 140 MMOL/L — SIGNIFICANT CHANGE UP (ref 135–146)
TRIGL SERPL-MCNC: 83 MG/DL — SIGNIFICANT CHANGE UP
WBC # BLD: 8.66 K/UL — SIGNIFICANT CHANGE UP (ref 3.8–10.5)
WBC # FLD AUTO: 8.66 K/UL — SIGNIFICANT CHANGE UP (ref 3.8–10.5)

## 2025-08-25 PROCEDURE — 99233 SBSQ HOSP IP/OBS HIGH 50: CPT

## 2025-08-25 PROCEDURE — 93306 TTE W/DOPPLER COMPLETE: CPT | Mod: 26

## 2025-08-25 RX ORDER — BUMETANIDE 1 MG/1
1 TABLET ORAL
Refills: 0 | Status: DISCONTINUED | OUTPATIENT
Start: 2025-08-26 | End: 2025-08-26

## 2025-08-25 RX ADMIN — BUMETANIDE 1 MILLIGRAM(S): 1 TABLET ORAL at 06:06

## 2025-08-25 RX ADMIN — MONTELUKAST SODIUM 10 MILLIGRAM(S): 10 TABLET ORAL at 15:01

## 2025-08-25 RX ADMIN — Medication 100 MILLIGRAM(S): at 17:45

## 2025-08-25 RX ADMIN — APIXABAN 5 MILLIGRAM(S): 2.5 TABLET, FILM COATED ORAL at 17:46

## 2025-08-25 RX ADMIN — GABAPENTIN 100 MILLIGRAM(S): 400 CAPSULE ORAL at 06:05

## 2025-08-25 RX ADMIN — GABAPENTIN 100 MILLIGRAM(S): 400 CAPSULE ORAL at 22:02

## 2025-08-25 RX ADMIN — METOPROLOL SUCCINATE 100 MILLIGRAM(S): 50 TABLET, EXTENDED RELEASE ORAL at 06:04

## 2025-08-25 RX ADMIN — Medication 1 APPLICATION(S): at 15:02

## 2025-08-25 RX ADMIN — GABAPENTIN 100 MILLIGRAM(S): 400 CAPSULE ORAL at 15:05

## 2025-08-25 RX ADMIN — CEFTRIAXONE 100 MILLIGRAM(S): 500 INJECTION, POWDER, FOR SOLUTION INTRAMUSCULAR; INTRAVENOUS at 22:01

## 2025-08-25 RX ADMIN — Medication 100 MICROGRAM(S): at 06:05

## 2025-08-25 RX ADMIN — ATORVASTATIN CALCIUM 20 MILLIGRAM(S): 80 TABLET, FILM COATED ORAL at 22:02

## 2025-08-25 RX ADMIN — Medication 100 MILLIGRAM(S): at 22:11

## 2025-08-25 RX ADMIN — Medication 10 MILLIGRAM(S): at 15:02

## 2025-08-25 RX ADMIN — AMIODARONE HYDROCHLORIDE 100 MILLIGRAM(S): 50 INJECTION, SOLUTION INTRAVENOUS at 06:05

## 2025-08-25 RX ADMIN — ISOSORBDIE DINITRATE 30 MILLIGRAM(S): 30 TABLET ORAL at 06:06

## 2025-08-25 RX ADMIN — APIXABAN 5 MILLIGRAM(S): 2.5 TABLET, FILM COATED ORAL at 06:05

## 2025-08-25 RX ADMIN — Medication 100 MILLIGRAM(S): at 06:05

## 2025-08-25 RX ADMIN — Medication 100 MILLIGRAM(S): at 06:07

## 2025-08-26 ENCOUNTER — APPOINTMENT (OUTPATIENT)
Dept: PAIN MANAGEMENT | Facility: CLINIC | Age: 85
End: 2025-08-26

## 2025-08-26 LAB
ALBUMIN SERPL ELPH-MCNC: 3 G/DL — LOW (ref 3.5–5.2)
ALP SERPL-CCNC: 111 U/L — SIGNIFICANT CHANGE UP (ref 30–115)
ALT FLD-CCNC: 38 U/L — SIGNIFICANT CHANGE UP (ref 0–41)
ANION GAP SERPL CALC-SCNC: 11 MMOL/L — SIGNIFICANT CHANGE UP (ref 7–14)
AST SERPL-CCNC: 36 U/L — SIGNIFICANT CHANGE UP (ref 0–41)
BASOPHILS # BLD AUTO: 0.04 K/UL — SIGNIFICANT CHANGE UP (ref 0–0.2)
BASOPHILS NFR BLD AUTO: 0.5 % — SIGNIFICANT CHANGE UP (ref 0–2)
BILIRUB SERPL-MCNC: 0.3 MG/DL — SIGNIFICANT CHANGE UP (ref 0.2–1.2)
BUN SERPL-MCNC: 22 MG/DL — HIGH (ref 10–20)
CALCIUM SERPL-MCNC: 8.8 MG/DL — SIGNIFICANT CHANGE UP (ref 8.4–10.5)
CHLORIDE SERPL-SCNC: 96 MMOL/L — LOW (ref 98–110)
CO2 SERPL-SCNC: 30 MMOL/L — SIGNIFICANT CHANGE UP (ref 17–32)
CREAT SERPL-MCNC: 1.3 MG/DL — SIGNIFICANT CHANGE UP (ref 0.7–1.5)
EGFR: 41 ML/MIN/1.73M2 — LOW
EGFR: 41 ML/MIN/1.73M2 — LOW
EOSINOPHIL # BLD AUTO: 0.55 K/UL — HIGH (ref 0–0.5)
EOSINOPHIL NFR BLD AUTO: 7.5 % — HIGH (ref 0–6)
GLUCOSE SERPL-MCNC: 87 MG/DL — SIGNIFICANT CHANGE UP (ref 70–99)
HCT VFR BLD CALC: 37.1 % — SIGNIFICANT CHANGE UP (ref 34.5–45)
HGB BLD-MCNC: 11.1 G/DL — LOW (ref 11.5–15.5)
IMM GRANULOCYTES # BLD AUTO: 0.02 K/UL — SIGNIFICANT CHANGE UP (ref 0–0.07)
IMM GRANULOCYTES NFR BLD AUTO: 0.3 % — SIGNIFICANT CHANGE UP (ref 0–0.9)
LYMPHOCYTES # BLD AUTO: 0.98 K/UL — LOW (ref 1–3.3)
LYMPHOCYTES NFR BLD AUTO: 13.3 % — SIGNIFICANT CHANGE UP (ref 13–44)
MCHC RBC-ENTMCNC: 22.6 PG — LOW (ref 27–34)
MCHC RBC-ENTMCNC: 29.9 G/DL — LOW (ref 32–36)
MCV RBC AUTO: 75.4 FL — LOW (ref 80–100)
MONOCYTES # BLD AUTO: 0.82 K/UL — SIGNIFICANT CHANGE UP (ref 0–0.9)
MONOCYTES NFR BLD AUTO: 11.1 % — SIGNIFICANT CHANGE UP (ref 2–14)
NEUTROPHILS # BLD AUTO: 4.96 K/UL — SIGNIFICANT CHANGE UP (ref 1.8–7.4)
NEUTROPHILS NFR BLD AUTO: 67.3 % — SIGNIFICANT CHANGE UP (ref 43–77)
NRBC # BLD AUTO: 0 K/UL — SIGNIFICANT CHANGE UP (ref 0–0)
NRBC # FLD: 0 K/UL — SIGNIFICANT CHANGE UP (ref 0–0)
NRBC BLD AUTO-RTO: 0 /100 WBCS — SIGNIFICANT CHANGE UP (ref 0–0)
PLATELET # BLD AUTO: 390 K/UL — SIGNIFICANT CHANGE UP (ref 150–400)
PMV BLD: 8.6 FL — SIGNIFICANT CHANGE UP (ref 7–13)
POTASSIUM SERPL-MCNC: 4.4 MMOL/L — SIGNIFICANT CHANGE UP (ref 3.5–5)
POTASSIUM SERPL-SCNC: 4.4 MMOL/L — SIGNIFICANT CHANGE UP (ref 3.5–5)
PROT SERPL-MCNC: 5.1 G/DL — LOW (ref 6–8)
RBC # BLD: 4.92 M/UL — SIGNIFICANT CHANGE UP (ref 3.8–5.2)
RBC # FLD: 20.4 % — HIGH (ref 10.3–14.5)
SODIUM SERPL-SCNC: 137 MMOL/L — SIGNIFICANT CHANGE UP (ref 135–146)
WBC # BLD: 7.37 K/UL — SIGNIFICANT CHANGE UP (ref 3.8–10.5)
WBC # FLD AUTO: 7.37 K/UL — SIGNIFICANT CHANGE UP (ref 3.8–10.5)

## 2025-08-26 PROCEDURE — 99233 SBSQ HOSP IP/OBS HIGH 50: CPT

## 2025-08-26 RX ORDER — POLYETHYLENE GLYCOL 3350 17 G/17G
17 POWDER, FOR SOLUTION ORAL DAILY
Refills: 0 | Status: DISCONTINUED | OUTPATIENT
Start: 2025-08-26 | End: 2025-08-30

## 2025-08-26 RX ORDER — SENNA 187 MG
2 TABLET ORAL AT BEDTIME
Refills: 0 | Status: DISCONTINUED | OUTPATIENT
Start: 2025-08-26 | End: 2025-08-30

## 2025-08-26 RX ORDER — GABAPENTIN 400 MG/1
1 CAPSULE ORAL
Refills: 0 | DISCHARGE

## 2025-08-26 RX ADMIN — Medication 100 MILLIGRAM(S): at 22:49

## 2025-08-26 RX ADMIN — CEFTRIAXONE 100 MILLIGRAM(S): 500 INJECTION, POWDER, FOR SOLUTION INTRAMUSCULAR; INTRAVENOUS at 22:48

## 2025-08-26 RX ADMIN — APIXABAN 5 MILLIGRAM(S): 2.5 TABLET, FILM COATED ORAL at 17:20

## 2025-08-26 RX ADMIN — Medication 2 TABLET(S): at 22:50

## 2025-08-26 RX ADMIN — Medication 100 MILLIGRAM(S): at 17:20

## 2025-08-26 RX ADMIN — ATORVASTATIN CALCIUM 20 MILLIGRAM(S): 80 TABLET, FILM COATED ORAL at 22:49

## 2025-08-26 RX ADMIN — Medication 1 DOSE(S): at 08:35

## 2025-08-26 RX ADMIN — Medication 650 MILLIGRAM(S): at 12:00

## 2025-08-26 RX ADMIN — Medication 100 MICROGRAM(S): at 05:58

## 2025-08-26 RX ADMIN — ISOSORBDIE DINITRATE 30 MILLIGRAM(S): 30 TABLET ORAL at 11:01

## 2025-08-26 RX ADMIN — Medication 10 MILLIGRAM(S): at 11:01

## 2025-08-26 RX ADMIN — Medication 100 MILLIGRAM(S): at 05:12

## 2025-08-26 RX ADMIN — GABAPENTIN 100 MILLIGRAM(S): 400 CAPSULE ORAL at 05:12

## 2025-08-26 RX ADMIN — Medication 650 MILLIGRAM(S): at 11:00

## 2025-08-26 RX ADMIN — APIXABAN 5 MILLIGRAM(S): 2.5 TABLET, FILM COATED ORAL at 05:12

## 2025-08-26 RX ADMIN — AMIODARONE HYDROCHLORIDE 100 MILLIGRAM(S): 50 INJECTION, SOLUTION INTRAVENOUS at 05:12

## 2025-08-26 RX ADMIN — MONTELUKAST SODIUM 10 MILLIGRAM(S): 10 TABLET ORAL at 11:00

## 2025-08-26 RX ADMIN — BUMETANIDE 1 MILLIGRAM(S): 1 TABLET ORAL at 09:27

## 2025-08-26 RX ADMIN — POLYETHYLENE GLYCOL 3350 17 GRAM(S): 17 POWDER, FOR SOLUTION ORAL at 18:12

## 2025-08-27 LAB
ALBUMIN SERPL ELPH-MCNC: 2.9 G/DL — LOW (ref 3.5–5.2)
ALP SERPL-CCNC: 100 U/L — SIGNIFICANT CHANGE UP (ref 30–115)
ALT FLD-CCNC: 30 U/L — SIGNIFICANT CHANGE UP (ref 0–41)
ANION GAP SERPL CALC-SCNC: 13 MMOL/L — SIGNIFICANT CHANGE UP (ref 7–14)
AST SERPL-CCNC: 32 U/L — SIGNIFICANT CHANGE UP (ref 0–41)
BASOPHILS # BLD AUTO: 0.05 K/UL — SIGNIFICANT CHANGE UP (ref 0–0.2)
BASOPHILS NFR BLD AUTO: 0.7 % — SIGNIFICANT CHANGE UP (ref 0–2)
BILIRUB SERPL-MCNC: 0.3 MG/DL — SIGNIFICANT CHANGE UP (ref 0.2–1.2)
BUN SERPL-MCNC: 24 MG/DL — HIGH (ref 10–20)
CALCIUM SERPL-MCNC: 8.5 MG/DL — SIGNIFICANT CHANGE UP (ref 8.4–10.5)
CHLORIDE SERPL-SCNC: 96 MMOL/L — LOW (ref 98–110)
CO2 SERPL-SCNC: 27 MMOL/L — SIGNIFICANT CHANGE UP (ref 17–32)
CREAT SERPL-MCNC: 1.4 MG/DL — SIGNIFICANT CHANGE UP (ref 0.7–1.5)
EGFR: 38 ML/MIN/1.73M2 — LOW
EGFR: 38 ML/MIN/1.73M2 — LOW
EOSINOPHIL # BLD AUTO: 0.36 K/UL — SIGNIFICANT CHANGE UP (ref 0–0.5)
EOSINOPHIL NFR BLD AUTO: 4.9 % — SIGNIFICANT CHANGE UP (ref 0–6)
GLUCOSE SERPL-MCNC: 106 MG/DL — HIGH (ref 70–99)
HCT VFR BLD CALC: 35.3 % — SIGNIFICANT CHANGE UP (ref 34.5–45)
HGB BLD-MCNC: 10.8 G/DL — LOW (ref 11.5–15.5)
IMM GRANULOCYTES # BLD AUTO: 0.03 K/UL — SIGNIFICANT CHANGE UP (ref 0–0.07)
IMM GRANULOCYTES NFR BLD AUTO: 0.4 % — SIGNIFICANT CHANGE UP (ref 0–0.9)
LYMPHOCYTES # BLD AUTO: 1 K/UL — SIGNIFICANT CHANGE UP (ref 1–3.3)
LYMPHOCYTES NFR BLD AUTO: 13.7 % — SIGNIFICANT CHANGE UP (ref 13–44)
MCHC RBC-ENTMCNC: 23.1 PG — LOW (ref 27–34)
MCHC RBC-ENTMCNC: 30.6 G/DL — LOW (ref 32–36)
MCV RBC AUTO: 75.6 FL — LOW (ref 80–100)
MONOCYTES # BLD AUTO: 0.67 K/UL — SIGNIFICANT CHANGE UP (ref 0–0.9)
MONOCYTES NFR BLD AUTO: 9.2 % — SIGNIFICANT CHANGE UP (ref 2–14)
NEUTROPHILS # BLD AUTO: 5.18 K/UL — SIGNIFICANT CHANGE UP (ref 1.8–7.4)
NEUTROPHILS NFR BLD AUTO: 71.1 % — SIGNIFICANT CHANGE UP (ref 43–77)
NRBC # BLD AUTO: 0 K/UL — SIGNIFICANT CHANGE UP (ref 0–0)
NRBC # FLD: 0 K/UL — SIGNIFICANT CHANGE UP (ref 0–0)
NRBC BLD AUTO-RTO: 0 /100 WBCS — SIGNIFICANT CHANGE UP (ref 0–0)
PLATELET # BLD AUTO: 370 K/UL — SIGNIFICANT CHANGE UP (ref 150–400)
PMV BLD: 9.2 FL — SIGNIFICANT CHANGE UP (ref 7–13)
POTASSIUM SERPL-MCNC: 4.3 MMOL/L — SIGNIFICANT CHANGE UP (ref 3.5–5)
POTASSIUM SERPL-SCNC: 4.3 MMOL/L — SIGNIFICANT CHANGE UP (ref 3.5–5)
PROT SERPL-MCNC: 5 G/DL — LOW (ref 6–8)
RBC # BLD: 4.67 M/UL — SIGNIFICANT CHANGE UP (ref 3.8–5.2)
RBC # FLD: 20.1 % — HIGH (ref 10.3–14.5)
SODIUM SERPL-SCNC: 136 MMOL/L — SIGNIFICANT CHANGE UP (ref 135–146)
WBC # BLD: 7.29 K/UL — SIGNIFICANT CHANGE UP (ref 3.8–10.5)
WBC # FLD AUTO: 7.29 K/UL — SIGNIFICANT CHANGE UP (ref 3.8–10.5)

## 2025-08-27 PROCEDURE — 99223 1ST HOSP IP/OBS HIGH 75: CPT

## 2025-08-27 PROCEDURE — 71045 X-RAY EXAM CHEST 1 VIEW: CPT | Mod: 26

## 2025-08-27 PROCEDURE — 99232 SBSQ HOSP IP/OBS MODERATE 35: CPT

## 2025-08-27 RX ORDER — BUMETANIDE 1 MG/1
1 TABLET ORAL DAILY
Refills: 0 | Status: DISCONTINUED | OUTPATIENT
Start: 2025-08-27 | End: 2025-08-30

## 2025-08-27 RX ORDER — METHYLPREDNISOLONE ACETATE 80 MG/ML
40 INJECTION, SUSPENSION INTRA-ARTICULAR; INTRALESIONAL; INTRAMUSCULAR; SOFT TISSUE EVERY 8 HOURS
Refills: 0 | Status: DISCONTINUED | OUTPATIENT
Start: 2025-08-27 | End: 2025-08-29

## 2025-08-27 RX ADMIN — Medication 100 MILLIGRAM(S): at 06:06

## 2025-08-27 RX ADMIN — Medication 100 MICROGRAM(S): at 06:05

## 2025-08-27 RX ADMIN — CEFTRIAXONE 100 MILLIGRAM(S): 500 INJECTION, POWDER, FOR SOLUTION INTRAMUSCULAR; INTRAVENOUS at 21:42

## 2025-08-27 RX ADMIN — APIXABAN 5 MILLIGRAM(S): 2.5 TABLET, FILM COATED ORAL at 06:06

## 2025-08-27 RX ADMIN — AMIODARONE HYDROCHLORIDE 100 MILLIGRAM(S): 50 INJECTION, SOLUTION INTRAVENOUS at 06:08

## 2025-08-27 RX ADMIN — METHYLPREDNISOLONE ACETATE 40 MILLIGRAM(S): 80 INJECTION, SUSPENSION INTRA-ARTICULAR; INTRALESIONAL; INTRAMUSCULAR; SOFT TISSUE at 21:42

## 2025-08-27 RX ADMIN — BUMETANIDE 1 MILLIGRAM(S): 1 TABLET ORAL at 17:19

## 2025-08-27 RX ADMIN — APIXABAN 5 MILLIGRAM(S): 2.5 TABLET, FILM COATED ORAL at 18:34

## 2025-08-27 RX ADMIN — METOPROLOL SUCCINATE 100 MILLIGRAM(S): 50 TABLET, EXTENDED RELEASE ORAL at 06:06

## 2025-08-27 RX ADMIN — ISOSORBDIE DINITRATE 30 MILLIGRAM(S): 30 TABLET ORAL at 06:05

## 2025-08-27 RX ADMIN — Medication 100 MILLIGRAM(S): at 06:04

## 2025-08-27 RX ADMIN — Medication 1 APPLICATION(S): at 11:29

## 2025-08-27 RX ADMIN — Medication 1 DOSE(S): at 21:42

## 2025-08-27 RX ADMIN — ISOSORBDIE DINITRATE 30 MILLIGRAM(S): 30 TABLET ORAL at 17:14

## 2025-08-27 RX ADMIN — Medication 650 MILLIGRAM(S): at 10:02

## 2025-08-27 RX ADMIN — Medication 650 MILLIGRAM(S): at 09:32

## 2025-08-27 RX ADMIN — Medication 10 MILLIGRAM(S): at 11:30

## 2025-08-27 RX ADMIN — Medication 100 MILLIGRAM(S): at 18:34

## 2025-08-27 RX ADMIN — MONTELUKAST SODIUM 10 MILLIGRAM(S): 10 TABLET ORAL at 11:29

## 2025-08-27 RX ADMIN — Medication 1 DOSE(S): at 08:39

## 2025-08-27 RX ADMIN — POLYETHYLENE GLYCOL 3350 17 GRAM(S): 17 POWDER, FOR SOLUTION ORAL at 11:29

## 2025-08-28 LAB
ALBUMIN SERPL ELPH-MCNC: 3.5 G/DL — SIGNIFICANT CHANGE UP (ref 3.5–5.2)
ALP SERPL-CCNC: 124 U/L — HIGH (ref 30–115)
ALT FLD-CCNC: 35 U/L — SIGNIFICANT CHANGE UP (ref 0–41)
ANION GAP SERPL CALC-SCNC: 17 MMOL/L — HIGH (ref 7–14)
AST SERPL-CCNC: 43 U/L — HIGH (ref 0–41)
BASOPHILS # BLD AUTO: 0 K/UL — SIGNIFICANT CHANGE UP (ref 0–0.2)
BASOPHILS NFR BLD AUTO: 0 % — SIGNIFICANT CHANGE UP (ref 0–2)
BILIRUB SERPL-MCNC: 0.3 MG/DL — SIGNIFICANT CHANGE UP (ref 0.2–1.2)
BUN SERPL-MCNC: 23 MG/DL — HIGH (ref 10–20)
CALCIUM SERPL-MCNC: 9.4 MG/DL — SIGNIFICANT CHANGE UP (ref 8.4–10.5)
CHLORIDE SERPL-SCNC: 96 MMOL/L — LOW (ref 98–110)
CO2 SERPL-SCNC: 25 MMOL/L — SIGNIFICANT CHANGE UP (ref 17–32)
CREAT SERPL-MCNC: 1.2 MG/DL — SIGNIFICANT CHANGE UP (ref 0.7–1.5)
EGFR: 45 ML/MIN/1.73M2 — LOW
EGFR: 45 ML/MIN/1.73M2 — LOW
EOSINOPHIL # BLD AUTO: 0 K/UL — SIGNIFICANT CHANGE UP (ref 0–0.5)
EOSINOPHIL NFR BLD AUTO: 0 % — SIGNIFICANT CHANGE UP (ref 0–6)
GLUCOSE SERPL-MCNC: 204 MG/DL — HIGH (ref 70–99)
HCT VFR BLD CALC: 37.4 % — SIGNIFICANT CHANGE UP (ref 34.5–45)
HGB BLD-MCNC: 11.4 G/DL — LOW (ref 11.5–15.5)
IMM GRANULOCYTES # BLD AUTO: 0.01 K/UL — SIGNIFICANT CHANGE UP (ref 0–0.07)
IMM GRANULOCYTES NFR BLD AUTO: 0.2 % — SIGNIFICANT CHANGE UP (ref 0–0.9)
LYMPHOCYTES # BLD AUTO: 0.4 K/UL — LOW (ref 1–3.3)
LYMPHOCYTES NFR BLD AUTO: 9.7 % — LOW (ref 13–44)
MCHC RBC-ENTMCNC: 23 PG — LOW (ref 27–34)
MCHC RBC-ENTMCNC: 30.5 G/DL — LOW (ref 32–36)
MCV RBC AUTO: 75.4 FL — LOW (ref 80–100)
MONOCYTES # BLD AUTO: 0.02 K/UL — SIGNIFICANT CHANGE UP (ref 0–0.9)
MONOCYTES NFR BLD AUTO: 0.5 % — LOW (ref 2–14)
NEUTROPHILS # BLD AUTO: 3.69 K/UL — SIGNIFICANT CHANGE UP (ref 1.8–7.4)
NEUTROPHILS NFR BLD AUTO: 89.6 % — HIGH (ref 43–77)
NRBC # BLD AUTO: 0 K/UL — SIGNIFICANT CHANGE UP (ref 0–0)
NRBC # FLD: 0 K/UL — SIGNIFICANT CHANGE UP (ref 0–0)
NRBC BLD AUTO-RTO: 0 /100 WBCS — SIGNIFICANT CHANGE UP (ref 0–0)
PLATELET # BLD AUTO: 370 K/UL — SIGNIFICANT CHANGE UP (ref 150–400)
PMV BLD: 9.3 FL — SIGNIFICANT CHANGE UP (ref 7–13)
POTASSIUM SERPL-MCNC: 4.6 MMOL/L — SIGNIFICANT CHANGE UP (ref 3.5–5)
POTASSIUM SERPL-SCNC: 4.6 MMOL/L — SIGNIFICANT CHANGE UP (ref 3.5–5)
PROCALCITONIN SERPL-MCNC: 0.07 NG/ML — SIGNIFICANT CHANGE UP (ref 0.02–0.1)
PROT SERPL-MCNC: 6 G/DL — SIGNIFICANT CHANGE UP (ref 6–8)
RBC # BLD: 4.96 M/UL — SIGNIFICANT CHANGE UP (ref 3.8–5.2)
RBC # FLD: 20.2 % — HIGH (ref 10.3–14.5)
SODIUM SERPL-SCNC: 138 MMOL/L — SIGNIFICANT CHANGE UP (ref 135–146)
WBC # BLD: 4.12 K/UL — SIGNIFICANT CHANGE UP (ref 3.8–10.5)
WBC # FLD AUTO: 4.12 K/UL — SIGNIFICANT CHANGE UP (ref 3.8–10.5)

## 2025-08-28 PROCEDURE — 99232 SBSQ HOSP IP/OBS MODERATE 35: CPT

## 2025-08-28 RX ORDER — SODIUM CHLORIDE 0.65 %
1 AEROSOL, SPRAY (ML) NASAL EVERY 6 HOURS
Refills: 0 | Status: DISCONTINUED | OUTPATIENT
Start: 2025-08-28 | End: 2025-08-30

## 2025-08-28 RX ADMIN — Medication 100 MILLIGRAM(S): at 05:33

## 2025-08-28 RX ADMIN — APIXABAN 5 MILLIGRAM(S): 2.5 TABLET, FILM COATED ORAL at 17:23

## 2025-08-28 RX ADMIN — METHYLPREDNISOLONE ACETATE 40 MILLIGRAM(S): 80 INJECTION, SUSPENSION INTRA-ARTICULAR; INTRALESIONAL; INTRAMUSCULAR; SOFT TISSUE at 05:31

## 2025-08-28 RX ADMIN — Medication 1 DOSE(S): at 07:34

## 2025-08-28 RX ADMIN — Medication 100 MILLIGRAM(S): at 17:24

## 2025-08-28 RX ADMIN — Medication 100 MILLIGRAM(S): at 14:13

## 2025-08-28 RX ADMIN — BUMETANIDE 1 MILLIGRAM(S): 1 TABLET ORAL at 05:33

## 2025-08-28 RX ADMIN — METOPROLOL SUCCINATE 100 MILLIGRAM(S): 50 TABLET, EXTENDED RELEASE ORAL at 05:32

## 2025-08-28 RX ADMIN — Medication 1 APPLICATION(S): at 12:01

## 2025-08-28 RX ADMIN — Medication 100 MILLIGRAM(S): at 05:31

## 2025-08-28 RX ADMIN — MONTELUKAST SODIUM 10 MILLIGRAM(S): 10 TABLET ORAL at 11:55

## 2025-08-28 RX ADMIN — CEFTRIAXONE 100 MILLIGRAM(S): 500 INJECTION, POWDER, FOR SOLUTION INTRAMUSCULAR; INTRAVENOUS at 22:12

## 2025-08-28 RX ADMIN — ATORVASTATIN CALCIUM 20 MILLIGRAM(S): 80 TABLET, FILM COATED ORAL at 22:13

## 2025-08-28 RX ADMIN — AMIODARONE HYDROCHLORIDE 100 MILLIGRAM(S): 50 INJECTION, SOLUTION INTRAVENOUS at 05:33

## 2025-08-28 RX ADMIN — METHYLPREDNISOLONE ACETATE 40 MILLIGRAM(S): 80 INJECTION, SUSPENSION INTRA-ARTICULAR; INTRALESIONAL; INTRAMUSCULAR; SOFT TISSUE at 14:13

## 2025-08-28 RX ADMIN — Medication 100 MILLIGRAM(S): at 22:13

## 2025-08-28 RX ADMIN — ISOSORBDIE DINITRATE 30 MILLIGRAM(S): 30 TABLET ORAL at 05:31

## 2025-08-28 RX ADMIN — ISOSORBDIE DINITRATE 30 MILLIGRAM(S): 30 TABLET ORAL at 17:23

## 2025-08-28 RX ADMIN — Medication 1 DOSE(S): at 22:14

## 2025-08-28 RX ADMIN — APIXABAN 5 MILLIGRAM(S): 2.5 TABLET, FILM COATED ORAL at 05:32

## 2025-08-28 RX ADMIN — Medication 10 MILLIGRAM(S): at 11:55

## 2025-08-28 RX ADMIN — ISOSORBDIE DINITRATE 30 MILLIGRAM(S): 30 TABLET ORAL at 11:55

## 2025-08-28 RX ADMIN — Medication 100 MICROGRAM(S): at 05:31

## 2025-08-28 RX ADMIN — METHYLPREDNISOLONE ACETATE 40 MILLIGRAM(S): 80 INJECTION, SUSPENSION INTRA-ARTICULAR; INTRALESIONAL; INTRAMUSCULAR; SOFT TISSUE at 22:12

## 2025-08-29 ENCOUNTER — TRANSCRIPTION ENCOUNTER (OUTPATIENT)
Age: 85
End: 2025-08-29

## 2025-08-29 LAB
ALBUMIN SERPL ELPH-MCNC: 3.5 G/DL — SIGNIFICANT CHANGE UP (ref 3.5–5.2)
ALP SERPL-CCNC: 106 U/L — SIGNIFICANT CHANGE UP (ref 30–115)
ALT FLD-CCNC: 34 U/L — SIGNIFICANT CHANGE UP (ref 0–41)
ANION GAP SERPL CALC-SCNC: 17 MMOL/L — HIGH (ref 7–14)
AST SERPL-CCNC: 35 U/L — SIGNIFICANT CHANGE UP (ref 0–41)
BASOPHILS # BLD AUTO: 0 K/UL — SIGNIFICANT CHANGE UP (ref 0–0.2)
BASOPHILS NFR BLD AUTO: 0 % — SIGNIFICANT CHANGE UP (ref 0–2)
BILIRUB SERPL-MCNC: 0.4 MG/DL — SIGNIFICANT CHANGE UP (ref 0.2–1.2)
BUN SERPL-MCNC: 33 MG/DL — HIGH (ref 10–20)
CALCIUM SERPL-MCNC: 9.7 MG/DL — SIGNIFICANT CHANGE UP (ref 8.4–10.5)
CHLORIDE SERPL-SCNC: 93 MMOL/L — LOW (ref 98–110)
CO2 SERPL-SCNC: 25 MMOL/L — SIGNIFICANT CHANGE UP (ref 17–32)
CREAT SERPL-MCNC: 1.2 MG/DL — SIGNIFICANT CHANGE UP (ref 0.7–1.5)
CULTURE RESULTS: SIGNIFICANT CHANGE UP
CULTURE RESULTS: SIGNIFICANT CHANGE UP
EGFR: 45 ML/MIN/1.73M2 — LOW
EGFR: 45 ML/MIN/1.73M2 — LOW
EOSINOPHIL # BLD AUTO: 0 K/UL — SIGNIFICANT CHANGE UP (ref 0–0.5)
EOSINOPHIL NFR BLD AUTO: 0 % — SIGNIFICANT CHANGE UP (ref 0–6)
GLUCOSE SERPL-MCNC: 143 MG/DL — HIGH (ref 70–99)
HCT VFR BLD CALC: 34.2 % — LOW (ref 34.5–45)
HGB BLD-MCNC: 10.8 G/DL — LOW (ref 11.5–15.5)
IMM GRANULOCYTES # BLD AUTO: 0.04 K/UL — SIGNIFICANT CHANGE UP (ref 0–0.07)
IMM GRANULOCYTES NFR BLD AUTO: 0.4 % — SIGNIFICANT CHANGE UP (ref 0–0.9)
LYMPHOCYTES # BLD AUTO: 0.56 K/UL — LOW (ref 1–3.3)
LYMPHOCYTES NFR BLD AUTO: 5.3 % — LOW (ref 13–44)
MCHC RBC-ENTMCNC: 23.3 PG — LOW (ref 27–34)
MCHC RBC-ENTMCNC: 31.6 G/DL — LOW (ref 32–36)
MCV RBC AUTO: 73.7 FL — LOW (ref 80–100)
MONOCYTES # BLD AUTO: 0.19 K/UL — SIGNIFICANT CHANGE UP (ref 0–0.9)
MONOCYTES NFR BLD AUTO: 1.8 % — LOW (ref 2–14)
NEUTROPHILS # BLD AUTO: 9.73 K/UL — HIGH (ref 1.8–7.4)
NEUTROPHILS NFR BLD AUTO: 92.5 % — HIGH (ref 43–77)
NRBC # BLD AUTO: 0 K/UL — SIGNIFICANT CHANGE UP (ref 0–0)
NRBC # FLD: 0 K/UL — SIGNIFICANT CHANGE UP (ref 0–0)
NRBC BLD AUTO-RTO: 0 /100 WBCS — SIGNIFICANT CHANGE UP (ref 0–0)
PLATELET # BLD AUTO: 425 K/UL — HIGH (ref 150–400)
PMV BLD: 9 FL — SIGNIFICANT CHANGE UP (ref 7–13)
POTASSIUM SERPL-MCNC: 4.1 MMOL/L — SIGNIFICANT CHANGE UP (ref 3.5–5)
POTASSIUM SERPL-SCNC: 4.1 MMOL/L — SIGNIFICANT CHANGE UP (ref 3.5–5)
PROT SERPL-MCNC: 6.2 G/DL — SIGNIFICANT CHANGE UP (ref 6–8)
RBC # BLD: 4.64 M/UL — SIGNIFICANT CHANGE UP (ref 3.8–5.2)
RBC # FLD: 19.5 % — HIGH (ref 10.3–14.5)
SODIUM SERPL-SCNC: 135 MMOL/L — SIGNIFICANT CHANGE UP (ref 135–146)
SPECIMEN SOURCE: SIGNIFICANT CHANGE UP
SPECIMEN SOURCE: SIGNIFICANT CHANGE UP
WBC # BLD: 10.52 K/UL — HIGH (ref 3.8–10.5)
WBC # FLD AUTO: 10.52 K/UL — HIGH (ref 3.8–10.5)

## 2025-08-29 PROCEDURE — 99232 SBSQ HOSP IP/OBS MODERATE 35: CPT

## 2025-08-29 RX ORDER — METHYLPREDNISOLONE ACETATE 80 MG/ML
40 INJECTION, SUSPENSION INTRA-ARTICULAR; INTRALESIONAL; INTRAMUSCULAR; SOFT TISSUE EVERY 12 HOURS
Refills: 0 | Status: DISCONTINUED | OUTPATIENT
Start: 2025-08-29 | End: 2025-08-30

## 2025-08-29 RX ADMIN — METHYLPREDNISOLONE ACETATE 40 MILLIGRAM(S): 80 INJECTION, SUSPENSION INTRA-ARTICULAR; INTRALESIONAL; INTRAMUSCULAR; SOFT TISSUE at 06:44

## 2025-08-29 RX ADMIN — ISOSORBDIE DINITRATE 30 MILLIGRAM(S): 30 TABLET ORAL at 10:33

## 2025-08-29 RX ADMIN — Medication 100 MILLIGRAM(S): at 13:39

## 2025-08-29 RX ADMIN — POLYETHYLENE GLYCOL 3350 17 GRAM(S): 17 POWDER, FOR SOLUTION ORAL at 10:33

## 2025-08-29 RX ADMIN — ATORVASTATIN CALCIUM 20 MILLIGRAM(S): 80 TABLET, FILM COATED ORAL at 21:37

## 2025-08-29 RX ADMIN — Medication 100 MICROGRAM(S): at 06:45

## 2025-08-29 RX ADMIN — Medication 10 MILLIGRAM(S): at 10:32

## 2025-08-29 RX ADMIN — Medication 2 TABLET(S): at 21:37

## 2025-08-29 RX ADMIN — Medication 1 APPLICATION(S): at 10:36

## 2025-08-29 RX ADMIN — Medication 100 MILLIGRAM(S): at 06:46

## 2025-08-29 RX ADMIN — Medication 1 DOSE(S): at 21:38

## 2025-08-29 RX ADMIN — METHYLPREDNISOLONE ACETATE 40 MILLIGRAM(S): 80 INJECTION, SUSPENSION INTRA-ARTICULAR; INTRALESIONAL; INTRAMUSCULAR; SOFT TISSUE at 16:23

## 2025-08-29 RX ADMIN — Medication 1 DOSE(S): at 07:42

## 2025-08-29 RX ADMIN — APIXABAN 5 MILLIGRAM(S): 2.5 TABLET, FILM COATED ORAL at 16:23

## 2025-08-29 RX ADMIN — Medication 100 MILLIGRAM(S): at 06:44

## 2025-08-29 RX ADMIN — AMIODARONE HYDROCHLORIDE 100 MILLIGRAM(S): 50 INJECTION, SOLUTION INTRAVENOUS at 06:45

## 2025-08-29 RX ADMIN — ISOSORBDIE DINITRATE 30 MILLIGRAM(S): 30 TABLET ORAL at 16:23

## 2025-08-29 RX ADMIN — Medication 100 MILLIGRAM(S): at 21:37

## 2025-08-29 RX ADMIN — ISOSORBDIE DINITRATE 30 MILLIGRAM(S): 30 TABLET ORAL at 06:46

## 2025-08-29 RX ADMIN — METOPROLOL SUCCINATE 100 MILLIGRAM(S): 50 TABLET, EXTENDED RELEASE ORAL at 06:46

## 2025-08-29 RX ADMIN — APIXABAN 5 MILLIGRAM(S): 2.5 TABLET, FILM COATED ORAL at 06:46

## 2025-08-29 RX ADMIN — BUMETANIDE 1 MILLIGRAM(S): 1 TABLET ORAL at 06:46

## 2025-08-29 RX ADMIN — METHYLPREDNISOLONE ACETATE 40 MILLIGRAM(S): 80 INJECTION, SUSPENSION INTRA-ARTICULAR; INTRALESIONAL; INTRAMUSCULAR; SOFT TISSUE at 13:39

## 2025-08-29 RX ADMIN — Medication 100 MILLIGRAM(S): at 16:20

## 2025-08-29 RX ADMIN — MONTELUKAST SODIUM 10 MILLIGRAM(S): 10 TABLET ORAL at 10:32

## 2025-08-30 ENCOUNTER — TRANSCRIPTION ENCOUNTER (OUTPATIENT)
Age: 85
End: 2025-08-30

## 2025-08-30 VITALS
SYSTOLIC BLOOD PRESSURE: 166 MMHG | TEMPERATURE: 98 F | OXYGEN SATURATION: 100 % | HEART RATE: 56 BPM | DIASTOLIC BLOOD PRESSURE: 71 MMHG | RESPIRATION RATE: 18 BRPM

## 2025-08-30 PROCEDURE — 99239 HOSP IP/OBS DSCHRG MGMT >30: CPT

## 2025-08-30 RX ORDER — METHYLPREDNISOLONE ACETATE 80 MG/ML
1 INJECTION, SUSPENSION INTRA-ARTICULAR; INTRALESIONAL; INTRAMUSCULAR; SOFT TISSUE
Qty: 1 | Refills: 0
Start: 2025-08-30

## 2025-08-30 RX ADMIN — Medication 100 MILLIGRAM(S): at 20:33

## 2025-08-30 RX ADMIN — Medication 1 DOSE(S): at 07:52

## 2025-08-30 RX ADMIN — Medication 100 MILLIGRAM(S): at 05:25

## 2025-08-30 RX ADMIN — MONTELUKAST SODIUM 10 MILLIGRAM(S): 10 TABLET ORAL at 11:30

## 2025-08-30 RX ADMIN — METHYLPREDNISOLONE ACETATE 40 MILLIGRAM(S): 80 INJECTION, SUSPENSION INTRA-ARTICULAR; INTRALESIONAL; INTRAMUSCULAR; SOFT TISSUE at 17:35

## 2025-08-30 RX ADMIN — ISOSORBDIE DINITRATE 30 MILLIGRAM(S): 30 TABLET ORAL at 11:30

## 2025-08-30 RX ADMIN — Medication 1 DOSE(S): at 20:33

## 2025-08-30 RX ADMIN — ISOSORBDIE DINITRATE 30 MILLIGRAM(S): 30 TABLET ORAL at 05:25

## 2025-08-30 RX ADMIN — METOPROLOL SUCCINATE 100 MILLIGRAM(S): 50 TABLET, EXTENDED RELEASE ORAL at 05:26

## 2025-08-30 RX ADMIN — Medication 100 MILLIGRAM(S): at 05:26

## 2025-08-30 RX ADMIN — AMIODARONE HYDROCHLORIDE 100 MILLIGRAM(S): 50 INJECTION, SOLUTION INTRAVENOUS at 05:25

## 2025-08-30 RX ADMIN — BUMETANIDE 1 MILLIGRAM(S): 1 TABLET ORAL at 05:26

## 2025-08-30 RX ADMIN — Medication 100 MILLIGRAM(S): at 17:35

## 2025-08-30 RX ADMIN — Medication 10 MILLIGRAM(S): at 11:29

## 2025-08-30 RX ADMIN — APIXABAN 5 MILLIGRAM(S): 2.5 TABLET, FILM COATED ORAL at 05:26

## 2025-08-30 RX ADMIN — METHYLPREDNISOLONE ACETATE 40 MILLIGRAM(S): 80 INJECTION, SUSPENSION INTRA-ARTICULAR; INTRALESIONAL; INTRAMUSCULAR; SOFT TISSUE at 05:26

## 2025-08-30 RX ADMIN — Medication 100 MICROGRAM(S): at 05:26

## 2025-08-30 RX ADMIN — APIXABAN 5 MILLIGRAM(S): 2.5 TABLET, FILM COATED ORAL at 17:35

## 2025-08-30 RX ADMIN — Medication 1 APPLICATION(S): at 11:30

## 2025-09-04 DIAGNOSIS — I27.20 PULMONARY HYPERTENSION, UNSPECIFIED: ICD-10-CM

## 2025-09-04 DIAGNOSIS — M81.0 AGE-RELATED OSTEOPOROSIS WITHOUT CURRENT PATHOLOGICAL FRACTURE: ICD-10-CM

## 2025-09-04 DIAGNOSIS — E44.0 MODERATE PROTEIN-CALORIE MALNUTRITION: ICD-10-CM

## 2025-09-04 DIAGNOSIS — J45.909 UNSPECIFIED ASTHMA, UNCOMPLICATED: ICD-10-CM

## 2025-09-04 DIAGNOSIS — I44.0 ATRIOVENTRICULAR BLOCK, FIRST DEGREE: ICD-10-CM

## 2025-09-04 DIAGNOSIS — J18.9 PNEUMONIA, UNSPECIFIED ORGANISM: ICD-10-CM

## 2025-09-04 DIAGNOSIS — J84.9 INTERSTITIAL PULMONARY DISEASE, UNSPECIFIED: ICD-10-CM

## 2025-09-04 DIAGNOSIS — N18.32 CHRONIC KIDNEY DISEASE, STAGE 3B: ICD-10-CM

## 2025-09-04 DIAGNOSIS — J96.01 ACUTE RESPIRATORY FAILURE WITH HYPOXIA: ICD-10-CM

## 2025-09-04 DIAGNOSIS — I24.89 OTHER FORMS OF ACUTE ISCHEMIC HEART DISEASE: ICD-10-CM
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
## 2025-09-04 DIAGNOSIS — I48.0 PAROXYSMAL ATRIAL FIBRILLATION: ICD-10-CM

## 2025-09-04 DIAGNOSIS — D63.8 ANEMIA IN OTHER CHRONIC DISEASES CLASSIFIED ELSEWHERE: ICD-10-CM

## 2025-09-04 DIAGNOSIS — E11.22 TYPE 2 DIABETES MELLITUS WITH DIABETIC CHRONIC KIDNEY DISEASE: ICD-10-CM

## 2025-09-04 DIAGNOSIS — E03.9 HYPOTHYROIDISM, UNSPECIFIED: ICD-10-CM
